# Patient Record
Sex: FEMALE | Race: WHITE
[De-identification: names, ages, dates, MRNs, and addresses within clinical notes are randomized per-mention and may not be internally consistent; named-entity substitution may affect disease eponyms.]

---

## 2018-10-03 ENCOUNTER — HOSPITAL ENCOUNTER (OUTPATIENT)
Dept: HOSPITAL 8 - LDOP | Age: 23
Discharge: HOME | End: 2018-10-03
Attending: OBSTETRICS & GYNECOLOGY
Payer: MEDICAID

## 2018-10-03 VITALS — HEIGHT: 64 IN | BODY MASS INDEX: 20.7 KG/M2 | WEIGHT: 121.25 LBS

## 2018-10-03 DIAGNOSIS — Z3A.37: ICD-10-CM

## 2018-10-03 DIAGNOSIS — O26.893: Primary | ICD-10-CM

## 2018-10-03 DIAGNOSIS — R10.9: ICD-10-CM

## 2018-10-03 PROCEDURE — 99211 OFF/OP EST MAY X REQ PHY/QHP: CPT

## 2018-10-03 PROCEDURE — G0463 HOSPITAL OUTPT CLINIC VISIT: HCPCS

## 2018-10-03 PROCEDURE — 59025 FETAL NON-STRESS TEST: CPT

## 2018-10-13 ENCOUNTER — HOSPITAL ENCOUNTER (INPATIENT)
Dept: HOSPITAL 8 - LDOP | Age: 23
LOS: 2 days | Discharge: HOME | End: 2018-10-15
Attending: OBSTETRICS & GYNECOLOGY | Admitting: OBSTETRICS & GYNECOLOGY
Payer: MEDICAID

## 2018-10-13 VITALS — WEIGHT: 119.05 LBS | BODY MASS INDEX: 20.32 KG/M2 | HEIGHT: 64 IN

## 2018-10-13 VITALS — SYSTOLIC BLOOD PRESSURE: 116 MMHG | DIASTOLIC BLOOD PRESSURE: 86 MMHG

## 2018-10-13 VITALS — DIASTOLIC BLOOD PRESSURE: 82 MMHG | SYSTOLIC BLOOD PRESSURE: 121 MMHG

## 2018-10-13 DIAGNOSIS — Z3A.38: ICD-10-CM

## 2018-10-13 LAB
BASOPHILS # BLD AUTO: 0.04 X10^3/UL (ref 0–0.1)
BASOPHILS NFR BLD AUTO: 0 % (ref 0–1)
EOSINOPHIL # BLD AUTO: 0.1 X10^3/UL (ref 0–0.4)
EOSINOPHIL NFR BLD AUTO: 1 % (ref 1–7)
ERYTHROCYTE [DISTWIDTH] IN BLOOD BY AUTOMATED COUNT: 14.1 % (ref 9.6–15.2)
LYMPHOCYTES # BLD AUTO: 1.85 X10^3/UL (ref 1–3.4)
LYMPHOCYTES NFR BLD AUTO: 13 % (ref 22–44)
MCH RBC QN AUTO: 30 PG (ref 27–34.8)
MCHC RBC AUTO-ENTMCNC: 32.9 G/DL (ref 32.4–35.8)
MCV RBC AUTO: 91.2 FL (ref 80–100)
MD: (no result)
MONOCYTES # BLD AUTO: 0.82 X10^3/UL (ref 0.2–0.8)
MONOCYTES NFR BLD AUTO: 6 % (ref 2–9)
NEUTROPHILS # BLD AUTO: 11.01 X10^3/UL (ref 1.8–6.8)
NEUTROPHILS NFR BLD AUTO: 80 % (ref 42–75)
PLATELET # BLD AUTO: 279 X10^3/UL (ref 130–400)
PMV BLD AUTO: 10.1 FL (ref 7.4–10.4)
RBC # BLD AUTO: 3.79 X10^6/UL (ref 3.82–5.3)

## 2018-10-13 PROCEDURE — 36415 COLL VENOUS BLD VENIPUNCTURE: CPT

## 2018-10-13 PROCEDURE — 85025 COMPLETE CBC W/AUTO DIFF WBC: CPT

## 2018-10-13 PROCEDURE — 86900 BLOOD TYPING SEROLOGIC ABO: CPT

## 2018-10-13 PROCEDURE — 00HU33Z INSERTION OF INFUSION DEVICE INTO SPINAL CANAL, PERCUTANEOUS APPROACH: ICD-10-PCS | Performed by: OBSTETRICS & GYNECOLOGY

## 2018-10-13 PROCEDURE — 10907ZC DRAINAGE OF AMNIOTIC FLUID, THERAPEUTIC FROM PRODUCTS OF CONCEPTION, VIA NATURAL OR ARTIFICIAL OPENING: ICD-10-PCS | Performed by: OBSTETRICS & GYNECOLOGY

## 2018-10-13 PROCEDURE — 90656 IIV3 VACC NO PRSV 0.5 ML IM: CPT

## 2018-10-13 PROCEDURE — 3E0R3BZ INTRODUCTION OF ANESTHETIC AGENT INTO SPINAL CANAL, PERCUTANEOUS APPROACH: ICD-10-PCS | Performed by: OBSTETRICS & GYNECOLOGY

## 2018-10-13 PROCEDURE — 86850 RBC ANTIBODY SCREEN: CPT

## 2018-10-13 RX ADMIN — Medication SCH MLS/HR: at 21:26

## 2018-10-13 RX ADMIN — SODIUM CHLORIDE, SODIUM LACTATE, POTASSIUM CHLORIDE, AND CALCIUM CHLORIDE SCH MLS/HR: .6; .31; .03; .02 INJECTION, SOLUTION INTRAVENOUS at 21:26

## 2018-10-14 VITALS — DIASTOLIC BLOOD PRESSURE: 81 MMHG | SYSTOLIC BLOOD PRESSURE: 120 MMHG

## 2018-10-14 VITALS — DIASTOLIC BLOOD PRESSURE: 74 MMHG | SYSTOLIC BLOOD PRESSURE: 113 MMHG

## 2018-10-14 VITALS — DIASTOLIC BLOOD PRESSURE: 70 MMHG | SYSTOLIC BLOOD PRESSURE: 112 MMHG

## 2018-10-14 VITALS — SYSTOLIC BLOOD PRESSURE: 115 MMHG | DIASTOLIC BLOOD PRESSURE: 79 MMHG

## 2018-10-14 LAB
BASOPHILS # BLD AUTO: 0.01 X10^3/UL (ref 0–0.1)
BASOPHILS NFR BLD AUTO: 0 % (ref 0–1)
EOSINOPHIL # BLD AUTO: 0.03 X10^3/UL (ref 0–0.4)
EOSINOPHIL NFR BLD AUTO: 0 % (ref 1–7)
ERYTHROCYTE [DISTWIDTH] IN BLOOD BY AUTOMATED COUNT: 14 % (ref 9.6–15.2)
LYMPHOCYTES # BLD AUTO: 1.7 X10^3/UL (ref 1–3.4)
LYMPHOCYTES NFR BLD AUTO: 9 % (ref 22–44)
MCH RBC QN AUTO: 30.3 PG (ref 27–34.8)
MCHC RBC AUTO-ENTMCNC: 33.4 G/DL (ref 32.4–35.8)
MCV RBC AUTO: 90.8 FL (ref 80–100)
MD: (no result)
MONOCYTES # BLD AUTO: 0.71 X10^3/UL (ref 0.2–0.8)
MONOCYTES NFR BLD AUTO: 4 % (ref 2–9)
NEUTROPHILS # BLD AUTO: 16.01 X10^3/UL (ref 1.8–6.8)
NEUTROPHILS NFR BLD AUTO: 87 % (ref 42–75)
PLATELET # BLD AUTO: 278 X10^3/UL (ref 130–400)
PMV BLD AUTO: 10 FL (ref 7.4–10.4)
RBC # BLD AUTO: 3.77 X10^6/UL (ref 3.82–5.3)

## 2018-10-14 RX ADMIN — SODIUM CHLORIDE, SODIUM LACTATE, POTASSIUM CHLORIDE, AND CALCIUM CHLORIDE SCH MLS/HR: .6; .31; .03; .02 INJECTION, SOLUTION INTRAVENOUS at 10:22

## 2018-10-14 RX ADMIN — DOCUSATE SODIUM PRN MG: 100 CAPSULE, LIQUID FILLED ORAL at 00:55

## 2018-10-14 RX ADMIN — SODIUM CHLORIDE, SODIUM LACTATE, POTASSIUM CHLORIDE, AND CALCIUM CHLORIDE SCH MLS/HR: .6; .31; .03; .02 INJECTION, SOLUTION INTRAVENOUS at 02:22

## 2018-10-14 RX ADMIN — IBUPROFEN PRN MG: 800 TABLET ORAL at 08:57

## 2018-10-14 RX ADMIN — PRENATAL VIT W/ FE FUMARATE-FA TAB 27-0.8 MG SCH EACH: 27-0.8 TAB at 08:56

## 2018-10-14 RX ADMIN — Medication SCH MLS/HR: at 17:10

## 2018-10-14 RX ADMIN — IBUPROFEN PRN MG: 800 TABLET ORAL at 17:13

## 2018-10-14 RX ADMIN — DOCUSATE SODIUM PRN MG: 100 CAPSULE, LIQUID FILLED ORAL at 08:57

## 2018-10-14 RX ADMIN — SODIUM CHLORIDE, SODIUM LACTATE, POTASSIUM CHLORIDE, AND CALCIUM CHLORIDE SCH MLS/HR: .6; .31; .03; .02 INJECTION, SOLUTION INTRAVENOUS at 18:22

## 2018-10-14 RX ADMIN — IBUPROFEN PRN MG: 800 TABLET ORAL at 00:55

## 2018-10-14 RX ADMIN — OXYCODONE HYDROCHLORIDE PRN MG: 5 TABLET ORAL at 19:48

## 2018-10-14 RX ADMIN — Medication SCH MLS/HR: at 07:10

## 2018-10-15 VITALS — SYSTOLIC BLOOD PRESSURE: 123 MMHG | DIASTOLIC BLOOD PRESSURE: 81 MMHG

## 2018-10-15 RX ADMIN — Medication SCH MLS/HR: at 03:10

## 2018-10-15 RX ADMIN — PRENATAL VIT W/ FE FUMARATE-FA TAB 27-0.8 MG SCH EACH: 27-0.8 TAB at 08:34

## 2018-10-15 RX ADMIN — SODIUM CHLORIDE, SODIUM LACTATE, POTASSIUM CHLORIDE, AND CALCIUM CHLORIDE SCH MLS/HR: .6; .31; .03; .02 INJECTION, SOLUTION INTRAVENOUS at 02:22

## 2018-10-15 RX ADMIN — DOCUSATE SODIUM PRN MG: 100 CAPSULE, LIQUID FILLED ORAL at 08:34

## 2018-10-15 RX ADMIN — IBUPROFEN PRN MG: 800 TABLET ORAL at 01:29

## 2018-10-15 RX ADMIN — IBUPROFEN PRN MG: 800 TABLET ORAL at 10:21

## 2018-10-15 RX ADMIN — OXYCODONE HYDROCHLORIDE PRN MG: 5 TABLET ORAL at 01:29

## 2018-10-17 ENCOUNTER — HOSPITAL ENCOUNTER (EMERGENCY)
Dept: HOSPITAL 8 - ED | Age: 23
Discharge: HOME | End: 2018-10-17
Payer: MEDICAID

## 2018-10-17 VITALS — DIASTOLIC BLOOD PRESSURE: 88 MMHG | SYSTOLIC BLOOD PRESSURE: 133 MMHG

## 2018-10-17 VITALS — BODY MASS INDEX: 18.52 KG/M2 | HEIGHT: 64 IN | WEIGHT: 108.47 LBS

## 2018-10-17 DIAGNOSIS — N61.0: Primary | ICD-10-CM

## 2018-10-17 PROCEDURE — 99283 EMERGENCY DEPT VISIT LOW MDM: CPT

## 2019-02-11 ENCOUNTER — HOSPITAL ENCOUNTER (EMERGENCY)
Dept: HOSPITAL 8 - ED | Age: 24
Discharge: HOME | End: 2019-02-11
Payer: MEDICAID

## 2019-02-11 VITALS — BODY MASS INDEX: 17.3 KG/M2 | HEIGHT: 63 IN | WEIGHT: 97.66 LBS

## 2019-02-11 VITALS — SYSTOLIC BLOOD PRESSURE: 106 MMHG | DIASTOLIC BLOOD PRESSURE: 65 MMHG

## 2019-02-11 DIAGNOSIS — O21.9: Primary | ICD-10-CM

## 2019-02-11 DIAGNOSIS — Z3A.01: ICD-10-CM

## 2019-02-11 DIAGNOSIS — J10.1: ICD-10-CM

## 2019-02-11 LAB
ALBUMIN SERPL-MCNC: 4.2 G/DL (ref 3.4–5)
ANION GAP SERPL CALC-SCNC: 6 MMOL/L (ref 5–15)
BASOPHILS # BLD AUTO: 0.01 X10^3/UL (ref 0–0.1)
BASOPHILS NFR BLD AUTO: 0 % (ref 0–1)
CALCIUM SERPL-MCNC: 9 MG/DL (ref 8.5–10.1)
CHLORIDE SERPL-SCNC: 107 MMOL/L (ref 98–107)
CREAT SERPL-MCNC: 0.86 MG/DL (ref 0.55–1.02)
CULTURE INDICATED?: YES
EOSINOPHIL # BLD AUTO: 0.01 X10^3/UL (ref 0–0.4)
EOSINOPHIL NFR BLD AUTO: 0 % (ref 1–7)
ERYTHROCYTE [DISTWIDTH] IN BLOOD BY AUTOMATED COUNT: 14.3 % (ref 9.6–15.2)
FLUAV AG SPEC QL IA: POSITIVE
LYMPHOCYTES # BLD AUTO: 0.57 X10^3/UL (ref 1–3.4)
LYMPHOCYTES NFR BLD AUTO: 8 % (ref 22–44)
MCH RBC QN AUTO: 29.3 PG (ref 27–34.8)
MCHC RBC AUTO-ENTMCNC: 33.2 G/DL (ref 32.4–35.8)
MCV RBC AUTO: 88.3 FL (ref 80–100)
MD: NO
MICROSCOPIC: (no result)
MONOCYTES # BLD AUTO: 0.75 X10^3/UL (ref 0.2–0.8)
MONOCYTES NFR BLD AUTO: 11 % (ref 2–9)
NEUTROPHILS # BLD AUTO: 5.58 X10^3/UL (ref 1.8–6.8)
NEUTROPHILS NFR BLD AUTO: 81 % (ref 42–75)
PLATELET # BLD AUTO: 348 X10^3/UL (ref 130–400)
PMV BLD AUTO: 8.3 FL (ref 7.4–10.4)
RBC # BLD AUTO: 4.78 X10^6/UL (ref 3.82–5.3)

## 2019-02-11 PROCEDURE — 76801 OB US < 14 WKS SINGLE FETUS: CPT

## 2019-02-11 PROCEDURE — 80048 BASIC METABOLIC PNL TOTAL CA: CPT

## 2019-02-11 PROCEDURE — 84702 CHORIONIC GONADOTROPIN TEST: CPT

## 2019-02-11 PROCEDURE — 81001 URINALYSIS AUTO W/SCOPE: CPT

## 2019-02-11 PROCEDURE — 82040 ASSAY OF SERUM ALBUMIN: CPT

## 2019-02-11 PROCEDURE — 87400 INFLUENZA A/B EACH AG IA: CPT

## 2019-02-11 PROCEDURE — 87086 URINE CULTURE/COLONY COUNT: CPT

## 2019-02-11 PROCEDURE — 99284 EMERGENCY DEPT VISIT MOD MDM: CPT

## 2019-02-11 PROCEDURE — 36415 COLL VENOUS BLD VENIPUNCTURE: CPT

## 2019-02-11 PROCEDURE — 84703 CHORIONIC GONADOTROPIN ASSAY: CPT

## 2019-02-11 PROCEDURE — 85025 COMPLETE CBC W/AUTO DIFF WBC: CPT

## 2019-02-11 NOTE — NUR
PT REPORTS IMPROVEMENT IN NAUSEA WITH MEDICATIONS. MEDICATED PER EMAR FOR PAIN. 
PT AND SO UPDATED TO POC (RESULTS/RECHECK) AND DEMONSTRATES UNDERSTANDING.

## 2019-02-11 NOTE — NUR
FIRST CONTACT WITH PT. PT LAYING IN CALI, NAD NOTED; PWD. PT REPORTS N/V/FLU 
LIKE SYMPTOMS X SEVERAL DAYS. "MY KIDS WERE JUST DIAGNOSED WITH THE FLU, I 
THINK I GOT IT FROM THEM". +GENERALIZED ABD PAIN/DYSURIA. 

LMP: "SOME TIME IN DECEMBER". . 



PT DENIES BLOOD IN EMESIS OR STOOL/VAGINAL DC OR BLEEDING. 

DISCUSSED POSITIVE HCG WITH PROVIDER, US ORDERED. PT UPDATED TO POC.

## 2020-04-08 ENCOUNTER — HOSPITAL ENCOUNTER (OUTPATIENT)
Facility: MEDICAL CENTER | Age: 25
End: 2020-04-08
Attending: NURSE PRACTITIONER
Payer: MEDICAID

## 2020-04-08 ENCOUNTER — OFFICE VISIT (OUTPATIENT)
Dept: URGENT CARE | Facility: CLINIC | Age: 25
End: 2020-04-08
Payer: MEDICAID

## 2020-04-08 VITALS
BODY MASS INDEX: 16.73 KG/M2 | TEMPERATURE: 101.4 F | RESPIRATION RATE: 12 BRPM | SYSTOLIC BLOOD PRESSURE: 104 MMHG | HEIGHT: 64 IN | HEART RATE: 94 BPM | OXYGEN SATURATION: 99 % | WEIGHT: 98 LBS | DIASTOLIC BLOOD PRESSURE: 64 MMHG

## 2020-04-08 DIAGNOSIS — R30.0 DYSURIA: ICD-10-CM

## 2020-04-08 DIAGNOSIS — N30.01 ACUTE CYSTITIS WITH HEMATURIA: ICD-10-CM

## 2020-04-08 LAB
APPEARANCE UR: ABNORMAL
BILIRUB UR STRIP-MCNC: ABNORMAL MG/DL
COLOR UR AUTO: ABNORMAL
FORWARD REASON: SPWHY: NORMAL
FORWARDED TO LAB: SPWHR: NORMAL
GLUCOSE UR STRIP.AUTO-MCNC: ABNORMAL MG/DL
INT CON NEG: NORMAL
INT CON POS: NORMAL
KETONES UR STRIP.AUTO-MCNC: 15 MG/DL
LEUKOCYTE ESTERASE UR QL STRIP.AUTO: ABNORMAL
NITRITE UR QL STRIP.AUTO: POSITIVE
PH UR STRIP.AUTO: 8.5 [PH] (ref 5–8)
POC URINE PREGNANCY TEST: NEGATIVE
PROT UR QL STRIP: >=300 MG/DL
RBC UR QL AUTO: ABNORMAL
SP GR UR STRIP.AUTO: 1.02
SPECIMEN SENT (2ND): SPWT2: NORMAL
SPECIMEN SENT (3RD): SPWT3: NORMAL
SPECIMEN SENT (4TH): SPWT4: NORMAL
SPECIMEN SENT: SPWT1: NORMAL
UROBILINOGEN UR STRIP-MCNC: 1 MG/DL

## 2020-04-08 PROCEDURE — 81002 URINALYSIS NONAUTO W/O SCOPE: CPT | Performed by: NURSE PRACTITIONER

## 2020-04-08 PROCEDURE — 99204 OFFICE O/P NEW MOD 45 MIN: CPT | Performed by: NURSE PRACTITIONER

## 2020-04-08 PROCEDURE — 81025 URINE PREGNANCY TEST: CPT | Performed by: NURSE PRACTITIONER

## 2020-04-08 RX ORDER — CIPROFLOXACIN 500 MG/1
500 TABLET, FILM COATED ORAL EVERY 12 HOURS
Qty: 14 TAB | Refills: 0 | Status: SHIPPED | OUTPATIENT
Start: 2020-04-08 | End: 2020-04-15

## 2020-04-08 ASSESSMENT — ENCOUNTER SYMPTOMS
CHILLS: 0
BACK PAIN: 0
DIARRHEA: 0
HEADACHES: 0
MYALGIAS: 0
FLANK PAIN: 1
DIZZINESS: 0
ABDOMINAL PAIN: 0
CONSTIPATION: 0
WEAKNESS: 0
VOMITING: 0
NAUSEA: 0
FEVER: 0

## 2020-04-08 NOTE — PROGRESS NOTES
"Subjective:      Samantha Fierro is a 24 y.o. female who presents with Abdominal Pain (x3 days); Dysuria (x3 days); and Flank Pain (right)            HPI  Dysuria. Blood in urine, possible start of menstrual cycle. Denies fever at home. Denies n/vor low pelvic pain. Right side flank pain. Prone to UTIs. Last UTI was last month. AZO usually helps with UTIs but state this time has not worked. No birth control, not currently sexually active. Has zofran at home.    PMH:  has no past medical history on file.  MEDS: No current outpatient medications on file.  ALLERGIES: No Known Allergies  SURGHX: History reviewed. No pertinent surgical history.  SOCHX:  reports that she has never smoked. She has never used smokeless tobacco. She reports that she does not drink alcohol or use drugs.  FH: Family history was reviewed, no pertinent findings to report    Review of Systems   Constitutional: Negative for chills, fever and malaise/fatigue.   Gastrointestinal: Negative for abdominal pain, constipation, diarrhea, nausea and vomiting.   Genitourinary: Positive for dysuria, flank pain, frequency, hematuria and urgency.   Musculoskeletal: Negative for back pain and myalgias.   Skin: Negative for itching and rash.   Neurological: Negative for dizziness, weakness and headaches.   All other systems reviewed and are negative.         Objective:     /64   Pulse 94   Temp (!) 38.6 °C (101.4 °F) (Temporal)   Resp 12   Ht 1.626 m (5' 4\")   Wt 44.5 kg (98 lb)   LMP 03/31/2020 (Exact Date)   SpO2 99%   Breastfeeding No   BMI 16.82 kg/m²      Physical Exam  Vitals signs reviewed.   Constitutional:       General: She is awake. She is not in acute distress.     Appearance: Normal appearance. She is well-developed. She is not ill-appearing, toxic-appearing or diaphoretic.   HENT:      Head: Normocephalic.   Eyes:      Conjunctiva/sclera: Conjunctivae normal.      Pupils: Pupils are equal, round, and reactive to light. "   Neck:      Musculoskeletal: Normal range of motion and neck supple.   Cardiovascular:      Rate and Rhythm: Normal rate.   Pulmonary:      Effort: Pulmonary effort is normal.   Abdominal:      General: Bowel sounds are normal. There is no distension.      Palpations: Abdomen is soft.      Tenderness: There is no abdominal tenderness. There is right CVA tenderness. There is no left CVA tenderness, guarding or rebound.   Musculoskeletal: Normal range of motion.   Skin:     General: Skin is warm and dry.   Neurological:      Mental Status: She is alert and oriented to person, place, and time.   Psychiatric:         Behavior: Behavior is cooperative.                 Assessment/Plan:       1. Acute cystitis with hematuria    - ciprofloxacin (CIPRO) 500 MG Tab; Take 1 Tab by mouth every 12 hours for 7 days.  Dispense: 14 Tab; Refill: 0  - Urine Culture; Future  - cefTRIAXone (ROCEPHIN) 1 g, lidocaine (XYLOCAINE) 1 % 3.6 mL for IM use    2. Dysuria    - POCT Urinalysis  - POCT PREGNANCY: NEG    Increase water intake  Urinate more frequently and empty bladder completely  Practice good toileting hygiene after bowel movements and sexual intercourse, refrain from sexual intercourse until infection cleared  Monitor for back/flank pain, difficulty urinating, blood in urine, fever, n/v in next 12 hrs- need re-evaluation    Call number [phone] if MyChart not available, will sign up today for this

## 2020-04-09 ENCOUNTER — TELEPHONE (OUTPATIENT)
Dept: URGENT CARE | Facility: PHYSICIAN GROUP | Age: 25
End: 2020-04-09

## 2020-04-09 NOTE — TELEPHONE ENCOUNTER
Called and spoke to patient regarding urine culture to be sent to [facility] lab. Patient to be notified when this comes back. Patient states doing better.

## 2020-04-10 ENCOUNTER — TELEPHONE (OUTPATIENT)
Dept: URGENT CARE | Facility: PHYSICIAN GROUP | Age: 25
End: 2020-04-10

## 2020-04-10 NOTE — TELEPHONE ENCOUNTER
"[facility] called by JEAN Ma, in [place], regarding urine culture status. Francesca states urine cullture \"still pending\". #[phone], option 2, acct#[id number].  "

## 2020-04-13 ENCOUNTER — TELEPHONE (OUTPATIENT)
Dept: URGENT CARE | Facility: PHYSICIAN GROUP | Age: 25
End: 2020-04-13

## 2020-04-13 NOTE — TELEPHONE ENCOUNTER
"Called patient twice today, on given phone number at time of exam, [phone], no answer, no message machine available to leave message. Urine culture result was received when I called [facility] today. Had [facility] fax to [facility] at [phone]. Spoke to JEAN Suazo at [facility], today, regarding [facility] to fax over result and scan into chart. Lakeisha agreed to this. Urine culture result states \">100.,00 E.coli\" per [facility] representative, Rachel, with [facility].     Patient on appropriate antibiotics, please finish completely.  "

## 2020-08-24 ENCOUNTER — HOSPITAL ENCOUNTER (EMERGENCY)
Dept: HOSPITAL 8 - ED | Age: 25
Discharge: HOME | End: 2020-08-24
Payer: MEDICAID

## 2020-08-24 VITALS — WEIGHT: 97 LBS | BODY MASS INDEX: 16.56 KG/M2 | HEIGHT: 64 IN

## 2020-08-24 VITALS — DIASTOLIC BLOOD PRESSURE: 74 MMHG | SYSTOLIC BLOOD PRESSURE: 118 MMHG

## 2020-08-24 DIAGNOSIS — L01.01: Primary | ICD-10-CM

## 2020-08-24 PROCEDURE — 99283 EMERGENCY DEPT VISIT LOW MDM: CPT

## 2020-08-24 PROCEDURE — 99281 EMR DPT VST MAYX REQ PHY/QHP: CPT

## 2022-05-18 SDOH — ECONOMIC STABILITY: FOOD INSECURITY: WITHIN THE PAST 12 MONTHS, THE FOOD YOU BOUGHT JUST DIDN'T LAST AND YOU DIDN'T HAVE MONEY TO GET MORE.: NEVER TRUE

## 2022-05-18 SDOH — ECONOMIC STABILITY: HOUSING INSECURITY
IN THE LAST 12 MONTHS, WAS THERE A TIME WHEN YOU DID NOT HAVE A STEADY PLACE TO SLEEP OR SLEPT IN A SHELTER (INCLUDING NOW)?: NO

## 2022-05-18 SDOH — ECONOMIC STABILITY: HOUSING INSECURITY: IN THE LAST 12 MONTHS, HOW MANY PLACES HAVE YOU LIVED?: 2

## 2022-05-18 SDOH — ECONOMIC STABILITY: FOOD INSECURITY: WITHIN THE PAST 12 MONTHS, YOU WORRIED THAT YOUR FOOD WOULD RUN OUT BEFORE YOU GOT MONEY TO BUY MORE.: NEVER TRUE

## 2022-05-18 SDOH — HEALTH STABILITY: PHYSICAL HEALTH: ON AVERAGE, HOW MANY DAYS PER WEEK DO YOU ENGAGE IN MODERATE TO STRENUOUS EXERCISE (LIKE A BRISK WALK)?: 7 DAYS

## 2022-05-18 SDOH — HEALTH STABILITY: PHYSICAL HEALTH: ON AVERAGE, HOW MANY MINUTES DO YOU ENGAGE IN EXERCISE AT THIS LEVEL?: 30 MIN

## 2022-05-18 SDOH — ECONOMIC STABILITY: INCOME INSECURITY: HOW HARD IS IT FOR YOU TO PAY FOR THE VERY BASICS LIKE FOOD, HOUSING, MEDICAL CARE, AND HEATING?: NOT VERY HARD

## 2022-05-18 SDOH — ECONOMIC STABILITY: TRANSPORTATION INSECURITY
IN THE PAST 12 MONTHS, HAS THE LACK OF TRANSPORTATION KEPT YOU FROM MEDICAL APPOINTMENTS OR FROM GETTING MEDICATIONS?: YES

## 2022-05-18 SDOH — ECONOMIC STABILITY: TRANSPORTATION INSECURITY
IN THE PAST 12 MONTHS, HAS LACK OF RELIABLE TRANSPORTATION KEPT YOU FROM MEDICAL APPOINTMENTS, MEETINGS, WORK OR FROM GETTING THINGS NEEDED FOR DAILY LIVING?: YES

## 2022-05-18 SDOH — ECONOMIC STABILITY: INCOME INSECURITY: IN THE LAST 12 MONTHS, WAS THERE A TIME WHEN YOU WERE NOT ABLE TO PAY THE MORTGAGE OR RENT ON TIME?: NO

## 2022-05-18 SDOH — HEALTH STABILITY: MENTAL HEALTH
STRESS IS WHEN SOMEONE FEELS TENSE, NERVOUS, ANXIOUS, OR CAN'T SLEEP AT NIGHT BECAUSE THEIR MIND IS TROUBLED. HOW STRESSED ARE YOU?: ONLY A LITTLE

## 2022-05-18 SDOH — ECONOMIC STABILITY: TRANSPORTATION INSECURITY
IN THE PAST 12 MONTHS, HAS LACK OF TRANSPORTATION KEPT YOU FROM MEETINGS, WORK, OR FROM GETTING THINGS NEEDED FOR DAILY LIVING?: YES

## 2022-05-18 ASSESSMENT — SOCIAL DETERMINANTS OF HEALTH (SDOH)
HOW OFTEN DO YOU ATTEND CHURCH OR RELIGIOUS SERVICES?: NEVER
HOW OFTEN DO YOU ATTENT MEETINGS OF THE CLUB OR ORGANIZATION YOU BELONG TO?: PATIENT DECLINED
HOW OFTEN DO YOU HAVE A DRINK CONTAINING ALCOHOL: NEVER
HOW OFTEN DO YOU GET TOGETHER WITH FRIENDS OR RELATIVES?: ONCE A WEEK
HOW OFTEN DO YOU ATTEND CHURCH OR RELIGIOUS SERVICES?: NEVER
WITHIN THE PAST 12 MONTHS, YOU WORRIED THAT YOUR FOOD WOULD RUN OUT BEFORE YOU GOT THE MONEY TO BUY MORE: NEVER TRUE
ARE YOU MARRIED, WIDOWED, DIVORCED, SEPARATED, NEVER MARRIED, OR LIVING WITH A PARTNER?: NEVER MARRIED
IN A TYPICAL WEEK, HOW MANY TIMES DO YOU TALK ON THE PHONE WITH FAMILY, FRIENDS, OR NEIGHBORS?: NEVER
DO YOU BELONG TO ANY CLUBS OR ORGANIZATIONS SUCH AS CHURCH GROUPS UNIONS, FRATERNAL OR ATHLETIC GROUPS, OR SCHOOL GROUPS?: NO
HOW OFTEN DO YOU HAVE SIX OR MORE DRINKS ON ONE OCCASION: MONTHLY
HOW OFTEN DO YOU ATTENT MEETINGS OF THE CLUB OR ORGANIZATION YOU BELONG TO?: PATIENT DECLINED
ARE YOU MARRIED, WIDOWED, DIVORCED, SEPARATED, NEVER MARRIED, OR LIVING WITH A PARTNER?: NEVER MARRIED
DO YOU BELONG TO ANY CLUBS OR ORGANIZATIONS SUCH AS CHURCH GROUPS UNIONS, FRATERNAL OR ATHLETIC GROUPS, OR SCHOOL GROUPS?: NO
HOW OFTEN DO YOU GET TOGETHER WITH FRIENDS OR RELATIVES?: ONCE A WEEK
HOW MANY DRINKS CONTAINING ALCOHOL DO YOU HAVE ON A TYPICAL DAY WHEN YOU ARE DRINKING: PATIENT DOES NOT DRINK
HOW HARD IS IT FOR YOU TO PAY FOR THE VERY BASICS LIKE FOOD, HOUSING, MEDICAL CARE, AND HEATING?: NOT VERY HARD
IN A TYPICAL WEEK, HOW MANY TIMES DO YOU TALK ON THE PHONE WITH FAMILY, FRIENDS, OR NEIGHBORS?: NEVER

## 2022-05-18 ASSESSMENT — LIFESTYLE VARIABLES
HOW MANY STANDARD DRINKS CONTAINING ALCOHOL DO YOU HAVE ON A TYPICAL DAY: PATIENT DOES NOT DRINK
HOW OFTEN DO YOU HAVE SIX OR MORE DRINKS ON ONE OCCASION: MONTHLY
HOW OFTEN DO YOU HAVE A DRINK CONTAINING ALCOHOL: NEVER
AUDIT-C TOTAL SCORE: 2
SKIP TO QUESTIONS 9-10: 0

## 2022-05-19 ENCOUNTER — OFFICE VISIT (OUTPATIENT)
Dept: MEDICAL GROUP | Facility: CLINIC | Age: 27
End: 2022-05-19
Payer: COMMERCIAL

## 2022-05-19 VITALS
SYSTOLIC BLOOD PRESSURE: 102 MMHG | OXYGEN SATURATION: 96 % | WEIGHT: 122 LBS | RESPIRATION RATE: 16 BRPM | BODY MASS INDEX: 20.83 KG/M2 | HEART RATE: 104 BPM | DIASTOLIC BLOOD PRESSURE: 64 MMHG | HEIGHT: 64 IN

## 2022-05-19 DIAGNOSIS — M67.431 GANGLION CYST OF DORSUM OF RIGHT WRIST: ICD-10-CM

## 2022-05-19 DIAGNOSIS — H60.391 SKIN OF RIGHT EARLOBE WITH INFECTION: Primary | ICD-10-CM

## 2022-05-19 PROCEDURE — 99203 OFFICE O/P NEW LOW 30 MIN: CPT | Mod: GE | Performed by: STUDENT IN AN ORGANIZED HEALTH CARE EDUCATION/TRAINING PROGRAM

## 2022-05-19 RX ORDER — CEPHALEXIN 500 MG/1
500 CAPSULE ORAL 4 TIMES DAILY
Qty: 28 CAPSULE | Refills: 0 | Status: SHIPPED | OUTPATIENT
Start: 2022-05-19 | End: 2022-05-26

## 2022-05-19 ASSESSMENT — PATIENT HEALTH QUESTIONNAIRE - PHQ9: CLINICAL INTERPRETATION OF PHQ2 SCORE: 0

## 2022-05-19 NOTE — ASSESSMENT & PLAN NOTE
Plan to treat simple, soft tissue infection with Keflex.  Strict return precautions given in case of worsening symptoms.  Patient declined referral to ENT but will monitor and follow-up as needed if considering surgical evaluation.

## 2022-05-19 NOTE — ASSESSMENT & PLAN NOTE
Patient likely has a right ganglion cyst.  Patient offered watchful waiting with NSAID treatment, aspiration, or surgical evaluation.  She elects for orthopedic surgery referral for definitive treatment.  Follow-up as needed.

## 2022-05-19 NOTE — PROGRESS NOTES
Subjective:   CC:   Chief Complaint   Patient presents with   • New Patient     Ganglion cyst on wrist, infection on upper ear       HPI: Samantha is a 26 y.o. female who presents today for the following problems:    Problem   Ganglion Cyst of Dorsum of Right Wrist    Right wrist ganglion cyst on the dorsum of the hand which has been increasing in size over the past several months.  Painful with pushing a stroller and carrying her infant.  No history of trauma.  No overlying skin complaints.  She does have some radiation of the mild-moderate pain up into the forearm but no other associated symptoms.  Not taking any medications for the pain.     Skin of Right Earlobe With Infection    Piercing site infection recurrently over the past year with purulent drainage occasionally she had 1 course of antibiotics which worked well about 3 months ago and erythema but currently only with mild erythema.  Denies fevers.  Endorses a lymph node postauricularly but no other associated symptoms.         Current Outpatient Medications   Medication Sig Dispense Refill   • cephALEXin (KEFLEX) 500 MG Cap Take 1 Capsule by mouth 4 times a day for 7 days. 28 Capsule 0     No current facility-administered medications for this visit.       Social History     Socioeconomic History   • Marital status: Single     Spouse name: Not on file   • Number of children: Not on file   • Years of education: Not on file   • Highest education level: 11th grade   Occupational History   • Not on file   Tobacco Use   • Smoking status: Never Smoker   • Smokeless tobacco: Never Used   Vaping Use   • Vaping Use: Never used   Substance and Sexual Activity   • Alcohol use: No   • Drug use: No   • Sexual activity: Yes     Partners: Male     Birth control/protection: Injection   Other Topics Concern   • Not on file   Social History Narrative   • Not on file     Social Determinants of Health     Financial Resource Strain: Low Risk    • Difficulty of Paying Living  "Expenses: Not very hard   Food Insecurity: No Food Insecurity   • Worried About Running Out of Food in the Last Year: Never true   • Ran Out of Food in the Last Year: Never true   Transportation Needs: Unmet Transportation Needs   • Lack of Transportation (Medical): Yes   • Lack of Transportation (Non-Medical): Yes   Physical Activity: Sufficiently Active   • Days of Exercise per Week: 7 days   • Minutes of Exercise per Session: 30 min   Stress: No Stress Concern Present   • Feeling of Stress : Only a little   Social Connections: Socially Isolated   • Frequency of Communication with Friends and Family: Never   • Frequency of Social Gatherings with Friends and Family: Once a week   • Attends [employer]ism Services: Never   • Active Member of Clubs or Organizations: No   • Attends Club or Organization Meetings: Patient refused   • Marital Status: Never    Intimate Partner Violence: Not on file   Housing Stability: Low Risk    • Unable to Pay for Housing in the Last Year: No   • Number of Places Lived in the Last Year: 2   • Unstable Housing in the Last Year: No       Objective:     Vitals:    05/19/22 0854   BP: 102/64   BP Location: Left arm   Patient Position: Sitting   BP Cuff Size: Adult   Pulse: (!) 104   Resp: 16   SpO2: 96%   Weight: 55.3 kg (122 lb)   Height: 1.626 m (5' 4\")     Body mass index is 20.94 kg/m².     Physical Exam:   VS: reviewed  GEN: appearance nl, NAD  SKIN: no rashes or lesions, anicteric   HEAD: AT/NC  EYES: conjunctiva clear, pupils equal  EARS: hearing  normal, external ear on the right with mild erythema about the piercing site and a clean, dry, and healing lesion on the auricle at the site of previous drainage but with no current discharge.  Site of infection is not tender to palpation with no fluctuation or mass in the area of concern.  NOSE: no discharge, external nose wnl  RESP: normal respiratory movements  EXT: no clubbing, cyanosis or edema  MSK: normal gait   NEURO: moving all " four extremities w/o focal deficit; alert, awake, cooperative, answers questions appropriately  PSYCH: mood and affect appropriate; not depressed     RIGHT WRIST:  - Inspection: No erythema or edema appreciated.  Approximately 1 x 1 cm, well circumscribed mass on the dorsum of the wrist with no overlying skin changes.  - Palpation: No TTP along palmar aspect of wrist. No TTP in the distal radius/ ulna.  no TTP along carpal bones / metacarpals except for mild TTP about the mass described above  - Neuro/Vasc: C5-T1 intact grossly, good pulses and good cap refill  - ROM: Full wrist flexion/extension/abduction/adduction  - Strength: 5/5 wrist flexion/extension. 5/5  strength  - Special: normal Phalen test, normal Tinel test       Assessment & Plan:   Skin of right earlobe with infection  Plan to treat simple, soft tissue infection with Keflex.  Strict return precautions given in case of worsening symptoms.  Patient declined referral to ENT but will monitor and follow-up as needed if considering surgical evaluation.    Ganglion cyst of dorsum of right wrist  Patient likely has a right ganglion cyst.  Patient offered watchful waiting with NSAID treatment, aspiration, or surgical evaluation.  She elects for orthopedic surgery referral for definitive treatment.  Follow-up as needed.      Followup: Return if symptoms worsen or fail to improve.    Christiano Fan D.O.    Please note that this dictation was created using voice recognition software. I have made every reasonable attempt to correct obvious errors, but I expect that there are errors of grammar and possibly content that I did not discover before finalizing the note.

## 2022-10-21 ENCOUNTER — OFFICE VISIT (OUTPATIENT)
Dept: URGENT CARE | Facility: CLINIC | Age: 27
End: 2022-10-21
Payer: COMMERCIAL

## 2022-10-21 VITALS
HEIGHT: 63 IN | TEMPERATURE: 97.5 F | SYSTOLIC BLOOD PRESSURE: 112 MMHG | HEART RATE: 92 BPM | BODY MASS INDEX: 21.4 KG/M2 | WEIGHT: 120.8 LBS | RESPIRATION RATE: 16 BRPM | OXYGEN SATURATION: 99 % | DIASTOLIC BLOOD PRESSURE: 70 MMHG

## 2022-10-21 DIAGNOSIS — H60.11: ICD-10-CM

## 2022-10-21 DIAGNOSIS — H60.01 ABSCESS OF EXTERNAL EAR, RIGHT: ICD-10-CM

## 2022-10-21 PROCEDURE — 99213 OFFICE O/P EST LOW 20 MIN: CPT | Performed by: FAMILY MEDICINE

## 2022-10-21 RX ORDER — SULFAMETHOXAZOLE AND TRIMETHOPRIM 800; 160 MG/1; MG/1
1 TABLET ORAL EVERY 12 HOURS
Qty: 14 TABLET | Refills: 0 | Status: SHIPPED | OUTPATIENT
Start: 2022-10-21 | End: 2022-10-28

## 2022-10-21 RX ORDER — IBUPROFEN 200 MG
600 TABLET ORAL ONCE
Status: COMPLETED | OUTPATIENT
Start: 2022-10-21 | End: 2022-10-21

## 2022-10-21 RX ORDER — AMOXICILLIN 500 MG/1
500 CAPSULE ORAL 3 TIMES DAILY
Qty: 21 CAPSULE | Refills: 0 | Status: SHIPPED | OUTPATIENT
Start: 2022-10-21 | End: 2022-10-28

## 2022-10-21 RX ADMIN — Medication 600 MG: at 21:26

## 2022-10-21 NOTE — LETTER
October 21, 2022         Patient: Samantha Fierro   YOB: 1995   Date of Visit: 10/21/2022           To Whom it May Concern:    Samantha Fierro was seen in my clinic on 10/21/2022. Please excuse from work 10/24/2022.      Sincerely,           Neftali Genao M.D.  Electronically Signed

## 2022-10-22 NOTE — PROGRESS NOTES
"Subjective     Samantha Fieror is a 27 y.o. female who presents with Follow-Up            Samantha was seen yesterday evening in the [place] emergency department.  Incision and drainage of chronic/recurrent right ear abscess was performed.  She is here for follow-up.  She has not started antibiotic yet.  She has been on several antibiotics in the past.  As far she knows it has never been cultured.  Initial symptoms started approximately 1 year ago associated with self piercing.  No fever.  No other aggravating or alleviating factors.      ROS           Objective     /70   Pulse 92   Temp 36.4 °C (97.5 °F) (Temporal)   Resp 16   Ht 1.6 m (5' 3\")   Wt 54.8 kg (120 lb 12.8 oz)   SpO2 99%   BMI 21.40 kg/m²      Physical Exam  Constitutional:       Appearance: Normal appearance.   HENT:      Ears:     Skin:     General: Skin is warm and dry.   Neurological:      Mental Status: She is alert.                           Assessment & Plan        1. Abscess of external ear, right    - amoxicillin (AMOXIL) 500 MG Cap; Take 1 Capsule by mouth 3 times a day for 7 days.  Dispense: 21 Capsule; Refill: 0  - sulfamethoxazole-trimethoprim (BACTRIM DS) 800-160 MG tablet; Take 1 Tablet by mouth every 12 hours for 7 days.  Dispense: 14 Tablet; Refill: 0    2. Cellulitis of pinna, right    - amoxicillin (AMOXIL) 500 MG Cap; Take 1 Capsule by mouth 3 times a day for 7 days.  Dispense: 21 Capsule; Refill: 0  - sulfamethoxazole-trimethoprim (BACTRIM DS) 800-160 MG tablet; Take 1 Tablet by mouth every 12 hours for 7 days.  Dispense: 14 Tablet; Refill: 0     Differential diagnosis, natural history, supportive care, and indications for immediate follow-up discussed at length.     Wound irrigated and dressed in clinic.  Initiate antibiotic.  Follow-up as needed.             "

## 2022-11-11 ENCOUNTER — OFFICE VISIT (OUTPATIENT)
Dept: MEDICAL GROUP | Facility: CLINIC | Age: 27
End: 2022-11-11
Payer: COMMERCIAL

## 2022-11-11 VITALS
TEMPERATURE: 98 F | WEIGHT: 124 LBS | HEART RATE: 92 BPM | BODY MASS INDEX: 22.82 KG/M2 | OXYGEN SATURATION: 96 % | SYSTOLIC BLOOD PRESSURE: 112 MMHG | DIASTOLIC BLOOD PRESSURE: 76 MMHG | HEIGHT: 62 IN

## 2022-11-11 DIAGNOSIS — H60.391 SKIN OF RIGHT EARLOBE WITH INFECTION: ICD-10-CM

## 2022-11-11 PROCEDURE — 99213 OFFICE O/P EST LOW 20 MIN: CPT | Mod: GE | Performed by: STUDENT IN AN ORGANIZED HEALTH CARE EDUCATION/TRAINING PROGRAM

## 2022-11-11 NOTE — ASSESSMENT & PLAN NOTE
No active infection. Resolving site of scar tissue present over superior aspect of pinna one slightly enlarged posterior auricular LN.       As far as scar tissue removal, referral to dermatology was offered and patient declined as she was reassured that there was no active infection and we do not anticipate lesion to enlarge or worsen unless it becomes infected again.     No keloid for intralesional injection.   No need for ABX

## 2022-11-11 NOTE — PROGRESS NOTES
"Subjective:     CC: ear pinna infection follow up    HPI:   Samantha presents today with lump to ear following infection    Problem   Skin of Right Earlobe With Infection    Piercing site infection recurrently over the past year with purulent drainage occasionally she had 1 course of antibiotics which worked well about 3 months ago and erythema but currently only with mild erythema.  Denies fevers.  Endorses a lymph node postauricularly but no other associated symptoms. Recently drained 2 weeks ago at ED.     No fevers, redness, drainage. No sore throat, decreased hearing, or neck pain. There is a persistent swelling that is firm over area of prior infection and drainage.     Today she is most interested in her cosmetic options for removal.          No current Epic-ordered outpatient medications on file.     No current Epic-ordered facility-administered medications on file.       ROS:  Gen: no fevers/chills, no changes in weight  Eyes: no changes in vision  ENT: no sore throat, no hearing loss, no bloody nose  Pulm: no sob, no cough  CV: no chest pain, no palpitations  GI: no nausea/vomiting, no diarrhea  : no dysuria  MSk: no myalgias  Skin: no rash  Neuro: no headaches, no numbness/tingling  Heme/Lymph: no easy bruising      Objective:     Exam:  /76   Pulse 92   Temp 36.7 °C (98 °F)   Ht 1.575 m (5' 2\")   Wt 56.2 kg (124 lb)   SpO2 96%   BMI 22.68 kg/m²  Body mass index is 22.68 kg/m².    Gen: Alert and oriented, No apparent distress.  Neck: Neck is supple without lymphadenopathy.    HEENT: normal bilateral external auditory canals, no neck swelling, no abnormality to oropharynx. 2 cm swelling to superior pinna that is non tender, firm, non fluctuant, no communication with surface of skin. One small 5mm posterior auricular LN that is firm and mobile and non tender. No associated erythema or drainage.     Lungs: Normal effort, CTA bilaterally, no wheezes, rhonchi, or rales  CV: Regular rate and " rhythm. No murmurs, rubs, or gallops.  Ext: No clubbing, cyanosis, edema.        Assessment & Plan:     27 y.o. female with the following -     Problem List Items Addressed This Visit       Skin of right earlobe with infection     No active infection. Resolving site of scar tissue present over superior aspect of pinna one slightly enlarged posterior auricular LN.       As far as scar tissue removal, referral to dermatology was offered and patient declined as she was reassured that there was no active infection and we do not anticipate lesion to enlarge or worsen unless it becomes infected again.     No keloid for intralesional injection.   No need for ABX

## 2024-02-04 ENCOUNTER — APPOINTMENT (OUTPATIENT)
Dept: RADIOLOGY | Facility: MEDICAL CENTER | Age: 29
End: 2024-02-04
Attending: EMERGENCY MEDICINE
Payer: COMMERCIAL

## 2024-02-04 ENCOUNTER — HOSPITAL ENCOUNTER (EMERGENCY)
Facility: MEDICAL CENTER | Age: 29
End: 2024-02-04
Attending: EMERGENCY MEDICINE
Payer: COMMERCIAL

## 2024-02-04 VITALS
TEMPERATURE: 96.7 F | HEART RATE: 94 BPM | DIASTOLIC BLOOD PRESSURE: 76 MMHG | BODY MASS INDEX: 24.59 KG/M2 | RESPIRATION RATE: 16 BRPM | OXYGEN SATURATION: 97 % | HEIGHT: 62 IN | SYSTOLIC BLOOD PRESSURE: 115 MMHG | WEIGHT: 133.6 LBS

## 2024-02-04 DIAGNOSIS — Z3A.32 32 WEEKS GESTATION OF PREGNANCY: ICD-10-CM

## 2024-02-04 DIAGNOSIS — R21 RASH: ICD-10-CM

## 2024-02-04 LAB
ALBUMIN SERPL BCP-MCNC: 3.6 G/DL (ref 3.2–4.9)
ALBUMIN/GLOB SERPL: 1 G/DL
ALP SERPL-CCNC: 168 U/L (ref 30–99)
ALT SERPL-CCNC: 51 U/L (ref 2–50)
ANION GAP SERPL CALC-SCNC: 15 MMOL/L (ref 7–16)
AST SERPL-CCNC: 48 U/L (ref 12–45)
BASOPHILS # BLD AUTO: 0 % (ref 0–1.8)
BASOPHILS # BLD: 0 K/UL (ref 0–0.12)
BILIRUB SERPL-MCNC: 0.3 MG/DL (ref 0.1–1.5)
BUN SERPL-MCNC: 5 MG/DL (ref 8–22)
BURR CELLS BLD QL SMEAR: NORMAL
CALCIUM ALBUM COR SERPL-MCNC: 9.6 MG/DL (ref 8.5–10.5)
CALCIUM SERPL-MCNC: 9.3 MG/DL (ref 8.5–10.5)
CHLORIDE SERPL-SCNC: 103 MMOL/L (ref 96–112)
CO2 SERPL-SCNC: 19 MMOL/L (ref 20–33)
CREAT SERPL-MCNC: 0.38 MG/DL (ref 0.5–1.4)
EOSINOPHIL # BLD AUTO: 0.21 K/UL (ref 0–0.51)
EOSINOPHIL NFR BLD: 1.7 % (ref 0–6.9)
ERYTHROCYTE [DISTWIDTH] IN BLOOD BY AUTOMATED COUNT: 47.1 FL (ref 35.9–50)
GFR SERPLBLD CREATININE-BSD FMLA CKD-EPI: 140 ML/MIN/1.73 M 2
GLOBULIN SER CALC-MCNC: 3.6 G/DL (ref 1.9–3.5)
GLUCOSE SERPL-MCNC: 101 MG/DL (ref 65–99)
HCT VFR BLD AUTO: 33.8 % (ref 37–47)
HGB BLD-MCNC: 11 G/DL (ref 12–16)
LYMPHOCYTES # BLD AUTO: 0.73 K/UL (ref 1–4.8)
LYMPHOCYTES NFR BLD: 6 % (ref 22–41)
MANUAL DIFF BLD: NORMAL
MCH RBC QN AUTO: 29.8 PG (ref 27–33)
MCHC RBC AUTO-ENTMCNC: 32.5 G/DL (ref 32.2–35.5)
MCV RBC AUTO: 91.6 FL (ref 81.4–97.8)
MICROCYTES BLD QL SMEAR: ABNORMAL
MONOCYTES # BLD AUTO: 0.52 K/UL (ref 0–0.85)
MONOCYTES NFR BLD AUTO: 4.3 % (ref 0–13.4)
MORPHOLOGY BLD-IMP: NORMAL
MYELOCYTES NFR BLD MANUAL: 0.9 %
NEUTROPHILS # BLD AUTO: 10.54 K/UL (ref 1.82–7.42)
NEUTROPHILS NFR BLD: 86.2 % (ref 44–72)
NEUTS BAND NFR BLD MANUAL: 0.9 % (ref 0–10)
NRBC # BLD AUTO: 0 K/UL
NRBC BLD-RTO: 0 /100 WBC (ref 0–0.2)
PLATELET # BLD AUTO: 268 K/UL (ref 164–446)
PLATELET BLD QL SMEAR: NORMAL
PMV BLD AUTO: 10.6 FL (ref 9–12.9)
POTASSIUM SERPL-SCNC: 3.6 MMOL/L (ref 3.6–5.5)
PROT SERPL-MCNC: 7.2 G/DL (ref 6–8.2)
RBC # BLD AUTO: 3.69 M/UL (ref 4.2–5.4)
RBC BLD AUTO: PRESENT
SODIUM SERPL-SCNC: 137 MMOL/L (ref 135–145)
WBC # BLD AUTO: 12.1 K/UL (ref 4.8–10.8)

## 2024-02-04 PROCEDURE — A9270 NON-COVERED ITEM OR SERVICE: HCPCS | Mod: UD | Performed by: EMERGENCY MEDICINE

## 2024-02-04 PROCEDURE — 80053 COMPREHEN METABOLIC PANEL: CPT

## 2024-02-04 PROCEDURE — 85007 BL SMEAR W/DIFF WBC COUNT: CPT

## 2024-02-04 PROCEDURE — 85027 COMPLETE CBC AUTOMATED: CPT

## 2024-02-04 PROCEDURE — 700102 HCHG RX REV CODE 250 W/ 637 OVERRIDE(OP): Mod: UD | Performed by: EMERGENCY MEDICINE

## 2024-02-04 PROCEDURE — 36415 COLL VENOUS BLD VENIPUNCTURE: CPT

## 2024-02-04 PROCEDURE — 99285 EMERGENCY DEPT VISIT HI MDM: CPT

## 2024-02-04 PROCEDURE — 76705 ECHO EXAM OF ABDOMEN: CPT

## 2024-02-04 RX ORDER — DIPHENHYDRAMINE HCL 25 MG
25 TABLET ORAL ONCE
Status: COMPLETED | OUTPATIENT
Start: 2024-02-04 | End: 2024-02-04

## 2024-02-04 RX ADMIN — DIPHENHYDRAMINE HYDROCHLORIDE 25 MG: 25 TABLET ORAL at 13:22

## 2024-02-04 ASSESSMENT — LIFESTYLE VARIABLES: DO YOU DRINK ALCOHOL: NO

## 2024-02-04 NOTE — DISCHARGE INSTRUCTIONS
Please use Benadryl for itching, you may take up to 2 tablets every 6 hours.  Please see your OB doctor on Thursday for follow-up as already scheduled.    Return for any change or worsening symptoms.

## 2024-02-04 NOTE — ED NOTES
Discharge instructions given.  All questions answered.  Pt to follow-up with OB/GYN, return to ER if symptoms worsen as discussed.  Pt verbalized understanding.  All belongings with pt.  Pt ambulated to lobby.

## 2024-02-04 NOTE — ED TRIAGE NOTES
Chief Complaint   Patient presents with    Itching     Pt reports itchiness since yesterday, starting in palms of hand and bottom of feet. Pt is 32 weeks pregnant.        Pt to triage with steady gait for above complaint. Presents with itching all over body, no apparent rash, palms are reddened.    Pt back to lobby, educated on triage process and encourage to alert staff of any changes.     Vitals:    02/04/24 1248   BP: 124/86   Pulse: (!) 106   Resp: 16   Temp: 36.8 °C (98.2 °F)   SpO2: 92%

## 2024-02-04 NOTE — ED PROVIDER NOTES
"ER Provider Note    Scribed for Bobo Leonard D.O. by Katie Rivera. 2/4/2024  1:09 PM    Primary Care Provider: None noted    CHIEF COMPLAINT  Chief Complaint   Patient presents with    Itching     Pt reports itchiness since yesterday, starting in palms of hand and bottom of feet. Pt is 32 weeks pregnant.      HPI/ROS    Samantha Fierro is a 28 y.o. female who presents to the Emergency Department for itchiness onset yesterday. Patient is currently 32 weeks pregnant. She states her itchiness began on her hands and feet, which has progressed to all over her body. Associated symptoms include nausea. Denies diarrhea or vomiting.     ROS as per HPI.    PAST MEDICAL HISTORY  History reviewed. No pertinent past medical history.    SURGICAL HISTORY  History reviewed. No pertinent surgical history.    FAMILY HISTORY  No pertinent family history     SOCIAL HISTORY   reports that she has never smoked. She has never used smokeless tobacco. She reports that she does not drink alcohol and does not use drugs.    CURRENT MEDICATIONS  No current outpatient medications    ALLERGIES  Patient has no known allergies.    PHYSICAL EXAM  /86   Pulse (!) 106   Temp 36.8 °C (98.2 °F) (Temporal)   Resp 16   Ht 1.575 m (5' 2\")   Wt 60.6 kg (133 lb 9.6 oz)   SpO2 92%   BMI 24.44 kg/m²     General: No acute distress.  HENT: Normocephalic, Mucus membranes are moist.   Chest: Lungs have even and unlabored respirations, Clear to auscultation.   Cardiovascular: Regular rate and regular rhythm, No peripheral cyanosis.  Abdomen: Non distended. Fundus height is above the umbilicus.   Skin: erythema to the palms, no other rashes noted.   Neuro: Awake, Conversive, Able to relay recent events.  Psychiatric: Calm and cooperative.     INITIAL ASSESSMENT  Patient has itching while pregnant, this may be related to pregnancy or non specific dermatitis. Concern for cholestasis will evaluate with labs and ultrasound.     ED " Observation Status? No; Patient does not meet criteria for ED Observation.     DIAGNOSTIC STUDIES  Labs:   Results for orders placed or performed during the hospital encounter of 02/04/24   CBC WITH DIFFERENTIAL   Result Value Ref Range    WBC 12.1 (H) 4.8 - 10.8 K/uL    RBC 3.69 (L) 4.20 - 5.40 M/uL    Hemoglobin 11.0 (L) 12.0 - 16.0 g/dL    Hematocrit 33.8 (L) 37.0 - 47.0 %    MCV 91.6 81.4 - 97.8 fL    MCH 29.8 27.0 - 33.0 pg    MCHC 32.5 32.2 - 35.5 g/dL    RDW 47.1 35.9 - 50.0 fL    Platelet Count 268 164 - 446 K/uL    MPV 10.6 9.0 - 12.9 fL    Neutrophils-Polys 86.20 (H) 44.00 - 72.00 %    Lymphocytes 6.00 (L) 22.00 - 41.00 %    Monocytes 4.30 0.00 - 13.40 %    Eosinophils 1.70 0.00 - 6.90 %    Basophils 0.00 0.00 - 1.80 %    Nucleated RBC 0.00 0.00 - 0.20 /100 WBC    Neutrophils (Absolute) 10.54 (H) 1.82 - 7.42 K/uL    Lymphs (Absolute) 0.73 (L) 1.00 - 4.80 K/uL    Monos (Absolute) 0.52 0.00 - 0.85 K/uL    Eos (Absolute) 0.21 0.00 - 0.51 K/uL    Baso (Absolute) 0.00 0.00 - 0.12 K/uL    NRBC (Absolute) 0.00 K/uL    Microcytosis 2+ (A)    COMP METABOLIC PANEL   Result Value Ref Range    Sodium 137 135 - 145 mmol/L    Potassium 3.6 3.6 - 5.5 mmol/L    Chloride 103 96 - 112 mmol/L    Co2 19 (L) 20 - 33 mmol/L    Anion Gap 15.0 7.0 - 16.0    Glucose 101 (H) 65 - 99 mg/dL    Bun 5 (L) 8 - 22 mg/dL    Creatinine 0.38 (L) 0.50 - 1.40 mg/dL    Calcium 9.3 8.5 - 10.5 mg/dL    Correct Calcium 9.6 8.5 - 10.5 mg/dL    AST(SGOT) 48 (H) 12 - 45 U/L    ALT(SGPT) 51 (H) 2 - 50 U/L    Alkaline Phosphatase 168 (H) 30 - 99 U/L    Total Bilirubin 0.3 0.1 - 1.5 mg/dL    Albumin 3.6 3.2 - 4.9 g/dL    Total Protein 7.2 6.0 - 8.2 g/dL    Globulin 3.6 (H) 1.9 - 3.5 g/dL    A-G Ratio 1.0 g/dL   ESTIMATED GFR   Result Value Ref Range    GFR (CKD-EPI) 140 >60 mL/min/1.73 m 2   DIFFERENTIAL MANUAL   Result Value Ref Range    Bands-Stabs 0.90 0.00 - 10.00 %    Myelocytes 0.90 %    Manual Diff Status PERFORMED    PERIPHERAL SMEAR REVIEW    Result Value Ref Range    Peripheral Smear Review see below    PLATELET ESTIMATE   Result Value Ref Range    Plt Estimation Normal    MORPHOLOGY   Result Value Ref Range    RBC Morphology Present     Echinocytes 1+      Radiology:   The attending emergency physician has independently interpreted the diagnostic imaging associated with this visit and am waiting the final reading from the radiologist.   Preliminary interpretation is as follows: No acute abnormalities   Radiologist interpretation:   US-RUQ   Final Result      No acute sonographic abnormality. No gallstones.        COURSE & MEDICAL DECISION MAKING     COURSE AND PLAN  1:13 PM - Patient was seen and evaluated at bedside. Patient presents to the ED for itching.  After my exam, I discussed with the patient the plan of care, which includes obtaining lab work and imaging for further evaluation. Patient understands and verbalizes agreement to plan of care. Ordered US RUQ, CBC w/ diff and CMP to evaluate.     3:02 PM - Patient was reevaluated at bedside. Itching imporved with benadryl, I can find no specific cause for this rash and can suspect it is related to pregnancy. Patient is to use benadryl for itching and follow up with her OB as planned. I then informed the patient of my plan for discharge, which includes strict return precautions for any new or worsening symptoms. Patient understands and verbalizes agreement to plan of care. Patient is comfortable going home at this time.     ED Summary: Patient presents with itching, she is 32 weeks pregnant.  There is some concerns for cholestasis.  Her liver enzymes are mildly elevated, the bilirubin is not elevated and ultrasound is negative.  She was medicated with Benadryl which did improve her itching, this is safe for pregnancy and she has an appointment on Thursday to follow-up with her OB doctor.  She is to return if any change or worsening symptoms.    DISPOSITION AND DISCUSSIONS  The patient will return  for new or worsening symptoms and is stable at the time of discharge.    DISPOSITION:  Patient will be discharged home in stable condition.    FOLLOW UP:    Please see your OB doctor on Thursday as scheduled  In 3 days      FINAL DIAGNOSIS  1. Rash    2. 32 weeks gestation of pregnancy      Katie BELLAMY (Scribe), am scribing for, and in the presence of, Bobo Leonard D.O..    Electronically signed by: Katie Rivera (Scribe), 2/4/2024    Bobo BELLAMY D.O. personally performed the services described in this documentation, as scribed by Katie Rivera in my presence, and it is both accurate and complete.     The note accurately reflects work and decisions made by me.  Bobo Leonard D.O.  2/4/2024  4:01 PM

## 2024-02-13 ENCOUNTER — APPOINTMENT (OUTPATIENT)
Dept: RADIOLOGY | Facility: MEDICAL CENTER | Age: 29
End: 2024-02-13
Attending: OBSTETRICS & GYNECOLOGY
Payer: COMMERCIAL

## 2024-02-13 ENCOUNTER — HOSPITAL ENCOUNTER (EMERGENCY)
Facility: MEDICAL CENTER | Age: 29
End: 2024-02-13
Attending: OBSTETRICS & GYNECOLOGY | Admitting: OBSTETRICS & GYNECOLOGY
Payer: COMMERCIAL

## 2024-02-13 VITALS
BODY MASS INDEX: 23.74 KG/M2 | TEMPERATURE: 96.6 F | SYSTOLIC BLOOD PRESSURE: 127 MMHG | OXYGEN SATURATION: 99 % | DIASTOLIC BLOOD PRESSURE: 84 MMHG | HEIGHT: 63 IN | RESPIRATION RATE: 19 BRPM | WEIGHT: 134 LBS | HEART RATE: 91 BPM

## 2024-02-13 LAB
ALBUMIN SERPL BCP-MCNC: 3.2 G/DL (ref 3.2–4.9)
ALBUMIN/GLOB SERPL: 0.8 G/DL
ALP SERPL-CCNC: 228 U/L (ref 30–99)
ALT SERPL-CCNC: 280 U/L (ref 2–50)
ANION GAP SERPL CALC-SCNC: 12 MMOL/L (ref 7–16)
AST SERPL-CCNC: 212 U/L (ref 12–45)
BASOPHILS # BLD AUTO: 0.4 % (ref 0–1.8)
BASOPHILS # BLD: 0.04 K/UL (ref 0–0.12)
BILIRUB SERPL-MCNC: 0.4 MG/DL (ref 0.1–1.5)
BUN SERPL-MCNC: 7 MG/DL (ref 8–22)
CALCIUM ALBUM COR SERPL-MCNC: 10.1 MG/DL (ref 8.5–10.5)
CALCIUM SERPL-MCNC: 9.5 MG/DL (ref 8.5–10.5)
CHLORIDE SERPL-SCNC: 104 MMOL/L (ref 96–112)
CO2 SERPL-SCNC: 20 MMOL/L (ref 20–33)
CREAT SERPL-MCNC: 0.43 MG/DL (ref 0.5–1.4)
EOSINOPHIL # BLD AUTO: 0.09 K/UL (ref 0–0.51)
EOSINOPHIL NFR BLD: 1 % (ref 0–6.9)
ERYTHROCYTE [DISTWIDTH] IN BLOOD BY AUTOMATED COUNT: 46.4 FL (ref 35.9–50)
GFR SERPLBLD CREATININE-BSD FMLA CKD-EPI: 136 ML/MIN/1.73 M 2
GLOBULIN SER CALC-MCNC: 4 G/DL (ref 1.9–3.5)
GLUCOSE SERPL-MCNC: 93 MG/DL (ref 65–99)
HCT VFR BLD AUTO: 30.9 % (ref 37–47)
HGB BLD-MCNC: 10.1 G/DL (ref 12–16)
IMM GRANULOCYTES # BLD AUTO: 0.24 K/UL (ref 0–0.11)
IMM GRANULOCYTES NFR BLD AUTO: 2.7 % (ref 0–0.9)
LYMPHOCYTES # BLD AUTO: 1.42 K/UL (ref 1–4.8)
LYMPHOCYTES NFR BLD: 15.9 % (ref 22–41)
MCH RBC QN AUTO: 30 PG (ref 27–33)
MCHC RBC AUTO-ENTMCNC: 32.7 G/DL (ref 32.2–35.5)
MCV RBC AUTO: 91.7 FL (ref 81.4–97.8)
MONOCYTES # BLD AUTO: 0.64 K/UL (ref 0–0.85)
MONOCYTES NFR BLD AUTO: 7.2 % (ref 0–13.4)
NEUTROPHILS # BLD AUTO: 6.5 K/UL (ref 1.82–7.42)
NEUTROPHILS NFR BLD: 72.8 % (ref 44–72)
NRBC # BLD AUTO: 0 K/UL
NRBC BLD-RTO: 0 /100 WBC (ref 0–0.2)
PLATELET # BLD AUTO: 298 K/UL (ref 164–446)
PMV BLD AUTO: 11.2 FL (ref 9–12.9)
POTASSIUM SERPL-SCNC: 3.6 MMOL/L (ref 3.6–5.5)
PROT SERPL-MCNC: 7.2 G/DL (ref 6–8.2)
RBC # BLD AUTO: 3.37 M/UL (ref 4.2–5.4)
SODIUM SERPL-SCNC: 136 MMOL/L (ref 135–145)
WBC # BLD AUTO: 8.9 K/UL (ref 4.8–10.8)

## 2024-02-13 PROCEDURE — 59025 FETAL NON-STRESS TEST: CPT | Mod: XU

## 2024-02-13 PROCEDURE — 80053 COMPREHEN METABOLIC PANEL: CPT

## 2024-02-13 PROCEDURE — 99284 EMERGENCY DEPT VISIT MOD MDM: CPT

## 2024-02-13 PROCEDURE — 85025 COMPLETE CBC W/AUTO DIFF WBC: CPT

## 2024-02-13 PROCEDURE — 76819 FETAL BIOPHYS PROFIL W/O NST: CPT

## 2024-02-13 PROCEDURE — 36415 COLL VENOUS BLD VENIPUNCTURE: CPT

## 2024-02-13 RX ORDER — URSODIOL 300 MG/1
300 CAPSULE ORAL 3 TIMES DAILY
Status: DISCONTINUED | OUTPATIENT
Start: 2024-02-13 | End: 2024-02-13 | Stop reason: HOSPADM

## 2024-02-13 ASSESSMENT — PAIN SCALES - GENERAL: PAINLEVEL: 0 - NO PAIN

## 2024-02-13 ASSESSMENT — FIBROSIS 4 INDEX: FIB4 SCORE: 0.68

## 2024-02-13 NOTE — PROGRESS NOTES
1430-Pt here from home with c/o decreased FM since 7am. Pt denies any contraction, leaking or bleeding. Pt placed on monitors (us and toco) POC discussed with pt. Pt states understanding and denies any questions.   1443-Dr Joe called, report given. FHT strip reviewed with MD. Orders received.   1721-Dr Joe called, updated regarding lab results. US pending results   1724-Monitors reapplied after ultrasound. (Us and toco).   1755- Dr Joe at bedside.   1825-Dr Joe reviewed strip. POC discussed. Discharge order received.   1830-Discharge instructions reviewed with pt. Pt states understanding and denies any questions. Pt has follow up appointment on Thursday with Dr Tena. Pt understands when to return for decreased FM, worsening symptoms or PTL.   1833-Pt discharged home with friends x2

## 2024-02-14 NOTE — ED PROVIDER NOTES
"Obstetric Emergency Department   Triage Assessment Note    ID: 28 y.o. is a  with IUP at 33w5d     Primary Obstetrician: [place] Tena M.D.    Assessing Obstetrician: Sj Joe MD    CC: decreased fetal movement    HPI: This 28-year-old -0-4-2 with intrauterine pregnancy at 33 weeks 5 days gestational age by first trimester ultrasound presents to labor and delivery with decreased fetal movement.  The patient has a putative diagnosis of intrahepatic cholestasis of pregnancy, however, her initial  Since her arrival the movement has returned to normal.  She is feeling regular fetal movement currently.  She denies leaking of fluid or vaginal bleeding.    ROS: 10 systems negative except as noted above.    O: /84   Pulse 91   Temp 35.9 °C (96.6 °F) (Temporal)   Resp 19   Ht 1.6 m (5' 3\")   Wt 60.8 kg (134 lb)   SpO2 99%   BMI 23.74 kg/m²    Gen: NAD, AAO  HEENT: NC/AT, MMM  Pulm: no distress  Abd: Gravid, soft, uterus NTTP, no rebound/guarding  Skin: no rash, palms of hands mildly erythematous from patient scratching.  Neuro: no gross deficits, EOMI  Psy: mood good, affect normal and congruent  Pelvic: SVE deferred    NST Procedure   NST: 145/mod karen/+accels, -decels in last hour of monitoring (decel early in admission while settling in bed, without recurrence, and continue moderate variability throughout). Continuous reactivity thereafter.    Interpretation: reactive for GA  [place]: rare, 20+ min apart    Labs (ordered/reviewed by me):  CBC  Recent Labs     24  1505   WBC 8.9   RBC 3.37*   HEMOGLOBIN 10.1*   HEMATOCRIT 30.9*   MCV 91.7   MCH 30.0   RDW 46.4   PLATELETCT 298   MPV 11.2   NEUTSPOLYS 72.80*   LYMPHOCYTES 15.90*   MONOCYTES 7.20   EOSINOPHILS 1.00   BASOPHILS 0.40         [place]  Recent Labs     24  1505   SODIUM 136   POTASSIUM 3.6   CHLORIDE 104   CO2 20   GLUCOSE 93   BUN 7*     Recent Labs     24  1505   ALBUMIN 3.2   TBILIRUBIN 0.4   ALKPHOSPHAT 228* "   TOTPROTEIN 7.2   ALTSGPT 280*   ASTSGOT 212*   CREATININE 0.43*          Imaging (images reviewed independently by me):  US-BIOPHYSICAL PROFILE   Final Result         1. Normal biophysical profile ultrasound.                  A/P: Samantha Fierro is a 28 y.o.  at 33w5d who presents for resolved decreased fetal movement in the setting of suspected intra cholestasis of pregnancy.  Reactive NST.    *Decreased fetal movement: Now resolved.  Patient feeling normal fetal movement currently.  Reactive NST, initial wandering baseline vs decel without contraction.  Remainder of the tracing showed reactivity without further decelerations.  A BPP was performed that was 8 out of 8.    -Strict fetal movement precautions were given.    *Suspected intrahepatic cholestasis of pregnancy: Evaluated a couple weeks ago in the emergency department without being sent to labor and delivery and without sending of bile acids.  These were sent Thursday after she presented back to clinic.  Liver enzymes were elevated then and have increased, however, given inability to  ursodiol until her first dose last night, I am not surprised that the liver enzymes today are further elevated from her last labs.  -Repeat bile acids to determine peak or sent, understanding that these too will not be back for about a week   -Delivery timing depending on peak bile acids.   -Continue ursodiol 300mg PO BID as Rx'd.   -Twice weekly NSTs in the office, already scheduled    Dispo: Okay for continued outpatient care.  Strict fetal movement precautions given.  Continue ursodiol and follow-up as scheduled on Thursday with Dr. Tena.      Sj Joe MD, MS, FACOG   2024, 6:05 PM   [facility]   [phone]        ADDENDUM: I spoke to the  myself about whether they had blood from the prior draw to send for bile acids.  The tech told me they did, and that it would a serum separator tube which they had.  We were  contacted by the lab an hour after this conversation, approximately 15 to 20 minutes after the patient had been discharged home, stating that they do not have the right tube for the sample.    Sj Joe MD, MS, FACOG   2/13/2024, 6:46 PM     [facility]   [phone]

## 2024-02-14 NOTE — DISCHARGE INSTRUCTIONS
Cholestasis of Pregnancy    Cholestasis of pregnancy is a condition that causes bile, a digestive fluid produced by the liver, to build up in the liver during pregnancy. This causes problems with liver function. Cholestasis of pregnancy most often happens during the third trimester, but it can occur at any time during pregnancy. This condition often goes away soon after giving birth.  Cholestasis can be harmful to both you and your baby. Cholestasis may increase the risk of:  Your baby being born too early ( delivery).  Your baby passing meconium, or his or her first stool (feces), while you are still pregnant.  Serious or life-threatening illness for you during pregnancy that requires a hospital stay. This includes preeclampsia.  Losing your baby before delivery (stillbirth).  What are the causes?  The exact cause of this condition is not known, but it may be related to:  Pregnancy hormones. These are chemicals in the body that may cause the flow of bile to slow down and build up in your liver. A buildup of bile may cause problems with your liver function.  Genetic mutations, or changes in your genes. These changes may happen in the genes that affect how your liver releases bile.  What increases the risk?  You are more likely to develop this condition if:  You had cholestasis of pregnancy in the past.  You have a family history of cholestasis of pregnancy.  You have liver problems.  You are pregnant with multiples, such as twins or triplets.  What are the signs or symptoms?  The most common symptom is intense itching (pruritus), especially on the palms of your hands and soles of your feet. The itching can spread to the rest of your body and is often worse at night. Other symptoms may include:  Feeling tired.  Pain in your upper right abdomen.  Dark-colored urine.  Light-colored stool.  Poor appetite.  Skin or the white parts of your eyes turning yellow (jaundice).  How is this diagnosed?  This condition is  diagnosed based on:  Your medical history.  A physical exam.  Blood tests.  You may also have genetic testing if you have an inherited risk for developing this condition. An inherited risk is one that was passed to you from a parent.  How is this treated?  This condition is treated with ways to keep you comfortable and keep your baby safe. Your health care provider may:  Prescribe medicine to reduce bile acid in your bloodstream and relieve symptoms.  Give you vitamin K before delivery to prevent excessive bleeding.  Check your baby often (fetal monitoring).  Do regular blood tests to check your bile levels and liver function until your baby is delivered.  Recommend starting (inducing) your labor and delivery by week 36 or 37 of pregnancy, or as soon as your baby's lungs have developed enough.  Follow these instructions at home:  Take over-the-counter and prescription medicines only as told by your health care provider.  Eat a healthy diet that includes plenty of fruits and vegetables.  Wear comfortable, loose-fitting, cotton clothing to reduce itching.  Take cool baths to soothe itchy skin.  Keep all follow-up visits and prenatal visits. This is important.  Contact a health care provider if:  Your symptoms get worse, even with treatment.  You develop pain in your right side.  You have unusual swelling in your abdomen, feet, ankles, or legs.  You have a fever.  You have new nausea or vomiting.  Your stool is an abnormal color.  Get help right away if:  You go into early labor.  You have a headache that does not go away, or you have a headache and develop vision changes.  You are unable to eat or drink for 24 hours due to nausea or vomiting.  You have severe pain in your abdomen.  You have shortness of breath.  Your skin or the white parts of your eyes turn yellow.  Summary  Cholestasis of pregnancy most often happens during the third trimester, but it can occur at any time during your pregnancy.  The most common  symptom of cholestasis of pregnancy is intense itching (pruritus), especially on the palms of your hands and soles of your feet.  This condition may be treated with medicine, frequent monitoring of your baby, testing of your blood, or starting (inducing) labor and delivery by week 36 or 37 of pregnancy.  Keep all follow-up visits and prenatal visits. This is important.  This information is not intended to replace advice given to you by your health care provider. Make sure you discuss any questions you have with your health care provider.  Document Revised: 08/18/2021 Document Reviewed: 08/18/2021  [employer] Patient Education © 2023 [employer]

## 2024-02-19 ENCOUNTER — APPOINTMENT (OUTPATIENT)
Dept: RADIOLOGY | Facility: MEDICAL CENTER | Age: 29
End: 2024-02-19
Attending: OBSTETRICS & GYNECOLOGY
Payer: COMMERCIAL

## 2024-02-19 ENCOUNTER — HOSPITAL ENCOUNTER (OUTPATIENT)
Facility: MEDICAL CENTER | Age: 29
End: 2024-02-19
Attending: OBSTETRICS & GYNECOLOGY | Admitting: OBSTETRICS & GYNECOLOGY
Payer: COMMERCIAL

## 2024-02-19 VITALS
WEIGHT: 134 LBS | BODY MASS INDEX: 23.74 KG/M2 | SYSTOLIC BLOOD PRESSURE: 121 MMHG | RESPIRATION RATE: 20 BRPM | OXYGEN SATURATION: 97 % | TEMPERATURE: 97.1 F | DIASTOLIC BLOOD PRESSURE: 75 MMHG | HEART RATE: 94 BPM | HEIGHT: 63 IN

## 2024-02-19 LAB
ALBUMIN SERPL BCP-MCNC: 3.2 G/DL (ref 3.2–4.9)
ALBUMIN/GLOB SERPL: 1 G/DL
ALP SERPL-CCNC: 179 U/L (ref 30–99)
ALT SERPL-CCNC: 112 U/L (ref 2–50)
ANION GAP SERPL CALC-SCNC: 11 MMOL/L (ref 7–16)
AST SERPL-CCNC: 47 U/L (ref 12–45)
BILIRUB SERPL-MCNC: 0.2 MG/DL (ref 0.1–1.5)
BUN SERPL-MCNC: 7 MG/DL (ref 8–22)
CALCIUM ALBUM COR SERPL-MCNC: 9.7 MG/DL (ref 8.5–10.5)
CALCIUM SERPL-MCNC: 9.1 MG/DL (ref 8.5–10.5)
CHLORIDE SERPL-SCNC: 104 MMOL/L (ref 96–112)
CO2 SERPL-SCNC: 21 MMOL/L (ref 20–33)
CREAT SERPL-MCNC: 0.45 MG/DL (ref 0.5–1.4)
GFR SERPLBLD CREATININE-BSD FMLA CKD-EPI: 135 ML/MIN/1.73 M 2
GLOBULIN SER CALC-MCNC: 3.3 G/DL (ref 1.9–3.5)
GLUCOSE SERPL-MCNC: 88 MG/DL (ref 65–99)
POTASSIUM SERPL-SCNC: 3.8 MMOL/L (ref 3.6–5.5)
PROT SERPL-MCNC: 6.5 G/DL (ref 6–8.2)
SODIUM SERPL-SCNC: 136 MMOL/L (ref 135–145)

## 2024-02-19 PROCEDURE — 76819 FETAL BIOPHYS PROFIL W/O NST: CPT

## 2024-02-19 PROCEDURE — 36415 COLL VENOUS BLD VENIPUNCTURE: CPT

## 2024-02-19 PROCEDURE — 83789 MASS SPECTROMETRY QUAL/QUAN: CPT

## 2024-02-19 PROCEDURE — 59025 FETAL NON-STRESS TEST: CPT | Mod: XU

## 2024-02-19 PROCEDURE — 80053 COMPREHEN METABOLIC PANEL: CPT

## 2024-02-19 ASSESSMENT — FIBROSIS 4 INDEX: FIB4 SCORE: 1.15

## 2024-02-19 NOTE — PROGRESS NOTES
1003: Pt is a  at 34.4 weeks today, pt presents to L&D for scheduled NST due to cholestasis. Pt reports +FM, denies VB/LOF/Uc's at this time. EFM and TOCO applied, VS taken, pt comfortable in bed at this time.     1045: Dr Tena at bedside, FHT reviewed, orders received for BPP and labs, see orders for details.    1220: Dr Tena at bedside, results reviewed, FHT reviewed. Discharge order received, see orders for details.     1128:  Discharge instructions given including  labor precautions, UC's, ROM, VB, abn FM, when to return to L&D, f/u with Dr. Tena, pelvic rest and modified bedrest. Questions answered at length. Pt and FOB verbalized understanding and agreement with POC and discharge. Discharge instructions signed.

## 2024-02-19 NOTE — ED TRIAGE NOTES
OB ED note    Date: 2024    Patient ID: 28-year-old  7 para 2-0-4-2 at 34+4 weeks by ultrasound (EDC 3/28/2024)    Chief complaint: Scheduled NST for  monitoring due to cholestasis of pregnancy.    History of present illness: The patient has prenatal care with myself.  She was recently diagnosed with cholestasis of pregnancy.  Her bile acids resulted on Friday which show a level of 38.3.  She has been taking Actigall which was increased to 300 mg 3 times daily on Thursday.  She reports that there has been some improvement in the itching of her palms and soles.  She states that  does still persist.  She endorses positive fetal movement.  She denies vaginal bleeding or loss of fluid.  She denies contractions.    Physical exam:  General: Alert, conversational, pleasant, no acute distress cardiovascular: Regular rate  Pulmonary: Distress, symmetric expansion  Abdomen: Soft, nontender, nondistended, gravid  Genitourinary: Deferred  Extremities: Moves all, no edema    NST: Baseline 140s, moderate ability for follow accelerations, no decelerations, tocometer quiet    Imaging: BPP .     Laboratory studies: Repeat CMP and bile acids ordered today.  The patient's bile acids on  were 38.3.  They took about a week to result.  The patient had a CMP drawn on  that was 108 that showed an AST of 108 and ALT of 197.  She had repeat labs drawn on  which showed an AST of 126 and ALT of 234.  Assessment/plan:  28-year-old  7 para 2-0-4-2 at 34+4 weeks (EDC 3/28/2024)  1.   intrauterine pregnancy at 34+4 weeks.  2.  Cholestasis of pregnancy    Discussion: The patient was seen and evaluated at bedside.  Her repeat LFTs on Thursday showed an increase.  On Thursday her Actigall was increased to from 300 mg p.o. twice daily to 300 mg p.o. 3 times daily.  Given the patient continues to have some symptoms I recommended that she take an additional 150 mg in the morning and at night.  This will  give a dose of 1200 g/day which is about the max dose for her body weight.  A BPP was performed today which was 8/8.  Given the patient's elevated bile acids and upward trending LFTs I will plan to refer her to the high risk pregnancy center for further management.  A repeat CMP and bile acids were drawn today but are pending.  Strict fetal kick count precautions were discussed today.  The patient is aware of the increased risk of fetal demise.  She verbalized understanding.    Plan:  1.  Okay to discharge home.  2.  Labor precautions.  2.  Fetal kick counts.  4.  Follow-up on 2/22 for additional monitoring.  5.  Increase Actigall to 450 mg in the morning and evening and 300 mg at lunch.     [place] Tena MD

## 2024-02-23 LAB
BILE AC SERPL-SCNC: 22.3 UMOL/L (ref 0–7)
CDCAE SERPL-SCNC: 3.5 UMOL/L (ref 0–3.4)
CHOLATE SERPL-SCNC: 6.6 UMOL/L (ref 0–1.9)
DO-CHOLATE SERPL-SCNC: 2.2 UMOL/L (ref 0–2.5)
URSODEOXYCHOLATE SERPL-SCNC: 10 UMOL/L (ref 0–1)

## 2024-03-07 ENCOUNTER — ANESTHESIA EVENT (OUTPATIENT)
Dept: ANESTHESIOLOGY | Facility: MEDICAL CENTER | Age: 29
End: 2024-03-07
Payer: COMMERCIAL

## 2024-03-07 ENCOUNTER — ANESTHESIA (OUTPATIENT)
Dept: ANESTHESIOLOGY | Facility: MEDICAL CENTER | Age: 29
End: 2024-03-07
Payer: COMMERCIAL

## 2024-03-07 ENCOUNTER — HOSPITAL ENCOUNTER (INPATIENT)
Facility: MEDICAL CENTER | Age: 29
LOS: 2 days | End: 2024-03-09
Attending: OBSTETRICS & GYNECOLOGY | Admitting: PEDIATRICS
Payer: COMMERCIAL

## 2024-03-07 ENCOUNTER — APPOINTMENT (OUTPATIENT)
Dept: OBGYN | Facility: MEDICAL CENTER | Age: 29
End: 2024-03-07
Attending: OBSTETRICS & GYNECOLOGY
Payer: COMMERCIAL

## 2024-03-07 LAB
BASOPHILS # BLD AUTO: 0.3 % (ref 0–1.8)
BASOPHILS # BLD: 0.04 K/UL (ref 0–0.12)
EOSINOPHIL # BLD AUTO: 0.11 K/UL (ref 0–0.51)
EOSINOPHIL NFR BLD: 0.9 % (ref 0–6.9)
ERYTHROCYTE [DISTWIDTH] IN BLOOD BY AUTOMATED COUNT: 50.1 FL (ref 35.9–50)
HCT VFR BLD AUTO: 31.8 % (ref 37–47)
HGB BLD-MCNC: 10.5 G/DL (ref 12–16)
HOLDING TUBE BB 8507: NORMAL
IMM GRANULOCYTES # BLD AUTO: 0.42 K/UL (ref 0–0.11)
IMM GRANULOCYTES NFR BLD AUTO: 3.5 % (ref 0–0.9)
LYMPHOCYTES # BLD AUTO: 1.6 K/UL (ref 1–4.8)
LYMPHOCYTES NFR BLD: 13.3 % (ref 22–41)
MCH RBC QN AUTO: 30.3 PG (ref 27–33)
MCHC RBC AUTO-ENTMCNC: 33 G/DL (ref 32.2–35.5)
MCV RBC AUTO: 91.9 FL (ref 81.4–97.8)
MONOCYTES # BLD AUTO: 0.84 K/UL (ref 0–0.85)
MONOCYTES NFR BLD AUTO: 7 % (ref 0–13.4)
NEUTROPHILS # BLD AUTO: 9.04 K/UL (ref 1.82–7.42)
NEUTROPHILS NFR BLD: 75 % (ref 44–72)
NRBC # BLD AUTO: 0 K/UL
NRBC BLD-RTO: 0 /100 WBC (ref 0–0.2)
PLATELET # BLD AUTO: 247 K/UL (ref 164–446)
PMV BLD AUTO: 11.7 FL (ref 9–12.9)
RBC # BLD AUTO: 3.46 M/UL (ref 4.2–5.4)
T PALLIDUM AB SER QL IA: NORMAL
WBC # BLD AUTO: 12.1 K/UL (ref 4.8–10.8)

## 2024-03-07 PROCEDURE — 700111 HCHG RX REV CODE 636 W/ 250 OVERRIDE (IP): Mod: JZ | Performed by: ANESTHESIOLOGY

## 2024-03-07 PROCEDURE — 86780 TREPONEMA PALLIDUM: CPT

## 2024-03-07 PROCEDURE — 700111 HCHG RX REV CODE 636 W/ 250 OVERRIDE (IP): Mod: JZ | Performed by: OBSTETRICS & GYNECOLOGY

## 2024-03-07 PROCEDURE — 36415 COLL VENOUS BLD VENIPUNCTURE: CPT

## 2024-03-07 PROCEDURE — 303615 HCHG EPIDURAL/SPINAL ANESTHESIA FOR LABOR

## 2024-03-07 PROCEDURE — 85025 COMPLETE CBC W/AUTO DIFF WBC: CPT

## 2024-03-07 PROCEDURE — 3E033VJ INTRODUCTION OF OTHER HORMONE INTO PERIPHERAL VEIN, PERCUTANEOUS APPROACH: ICD-10-PCS | Performed by: OBSTETRICS & GYNECOLOGY

## 2024-03-07 PROCEDURE — 770002 HCHG ROOM/CARE - OB PRIVATE (112)

## 2024-03-07 PROCEDURE — 700101 HCHG RX REV CODE 250: Performed by: ANESTHESIOLOGY

## 2024-03-07 PROCEDURE — 700105 HCHG RX REV CODE 258: Performed by: OBSTETRICS & GYNECOLOGY

## 2024-03-07 RX ORDER — ROPIVACAINE HYDROCHLORIDE 2 MG/ML
INJECTION, SOLUTION EPIDURAL; INFILTRATION; PERINEURAL CONTINUOUS
Status: DISCONTINUED | OUTPATIENT
Start: 2024-03-07 | End: 2024-03-09 | Stop reason: HOSPADM

## 2024-03-07 RX ORDER — OXYTOCIN 10 [USP'U]/ML
10 INJECTION, SOLUTION INTRAMUSCULAR; INTRAVENOUS
Status: DISCONTINUED | OUTPATIENT
Start: 2024-03-07 | End: 2024-03-08 | Stop reason: HOSPADM

## 2024-03-07 RX ORDER — ROPIVACAINE HYDROCHLORIDE 2 MG/ML
INJECTION, SOLUTION EPIDURAL; INFILTRATION; PERINEURAL
Status: ACTIVE
Start: 2024-03-07 | End: 2024-03-08

## 2024-03-07 RX ORDER — SODIUM CHLORIDE, SODIUM LACTATE, POTASSIUM CHLORIDE, AND CALCIUM CHLORIDE .6; .31; .03; .02 G/100ML; G/100ML; G/100ML; G/100ML
1000 INJECTION, SOLUTION INTRAVENOUS
Status: DISCONTINUED | OUTPATIENT
Start: 2024-03-07 | End: 2024-03-08 | Stop reason: HOSPADM

## 2024-03-07 RX ORDER — MISOPROSTOL 200 UG/1
800 TABLET ORAL
Status: DISCONTINUED | OUTPATIENT
Start: 2024-03-07 | End: 2024-03-08 | Stop reason: HOSPADM

## 2024-03-07 RX ORDER — FENUGREEK SEED/BL.THISTLE/ANIS 340 MG
CAPSULE ORAL
COMMUNITY

## 2024-03-07 RX ORDER — TERBUTALINE SULFATE 1 MG/ML
0.25 INJECTION, SOLUTION SUBCUTANEOUS
Status: DISCONTINUED | OUTPATIENT
Start: 2024-03-07 | End: 2024-03-08 | Stop reason: HOSPADM

## 2024-03-07 RX ORDER — CARBOPROST TROMETHAMINE 250 UG/ML
250 INJECTION, SOLUTION INTRAMUSCULAR
Status: DISCONTINUED | OUTPATIENT
Start: 2024-03-07 | End: 2024-03-08 | Stop reason: HOSPADM

## 2024-03-07 RX ORDER — ACETAMINOPHEN 500 MG
1000 TABLET ORAL
Status: COMPLETED | OUTPATIENT
Start: 2024-03-07 | End: 2024-03-08

## 2024-03-07 RX ORDER — IBUPROFEN 800 MG/1
800 TABLET ORAL
Status: DISCONTINUED | OUTPATIENT
Start: 2024-03-07 | End: 2024-03-08 | Stop reason: HOSPADM

## 2024-03-07 RX ORDER — LIDOCAINE HYDROCHLORIDE AND EPINEPHRINE 15; 5 MG/ML; UG/ML
INJECTION, SOLUTION EPIDURAL
Status: COMPLETED | OUTPATIENT
Start: 2024-03-07 | End: 2024-03-07

## 2024-03-07 RX ORDER — ONDANSETRON 2 MG/ML
4 INJECTION INTRAMUSCULAR; INTRAVENOUS EVERY 6 HOURS PRN
Status: DISCONTINUED | OUTPATIENT
Start: 2024-03-07 | End: 2024-03-08 | Stop reason: HOSPADM

## 2024-03-07 RX ORDER — METHYLERGONOVINE MALEATE 0.2 MG/ML
0.2 INJECTION INTRAVENOUS
Status: DISCONTINUED | OUTPATIENT
Start: 2024-03-07 | End: 2024-03-08 | Stop reason: HOSPADM

## 2024-03-07 RX ORDER — ONDANSETRON 4 MG/1
4 TABLET, ORALLY DISINTEGRATING ORAL EVERY 6 HOURS PRN
Status: DISCONTINUED | OUTPATIENT
Start: 2024-03-07 | End: 2024-03-08 | Stop reason: HOSPADM

## 2024-03-07 RX ORDER — SODIUM CHLORIDE, SODIUM LACTATE, POTASSIUM CHLORIDE, AND CALCIUM CHLORIDE .6; .31; .03; .02 G/100ML; G/100ML; G/100ML; G/100ML
250 INJECTION, SOLUTION INTRAVENOUS PRN
Status: DISCONTINUED | OUTPATIENT
Start: 2024-03-07 | End: 2024-03-08 | Stop reason: HOSPADM

## 2024-03-07 RX ORDER — SODIUM CHLORIDE, SODIUM LACTATE, POTASSIUM CHLORIDE, CALCIUM CHLORIDE 600; 310; 30; 20 MG/100ML; MG/100ML; MG/100ML; MG/100ML
INJECTION, SOLUTION INTRAVENOUS CONTINUOUS
Status: DISCONTINUED | OUTPATIENT
Start: 2024-03-07 | End: 2024-03-09 | Stop reason: HOSPADM

## 2024-03-07 RX ORDER — LIDOCAINE HYDROCHLORIDE 10 MG/ML
20 INJECTION, SOLUTION INFILTRATION; PERINEURAL
Status: DISCONTINUED | OUTPATIENT
Start: 2024-03-07 | End: 2024-03-08 | Stop reason: HOSPADM

## 2024-03-07 RX ORDER — EPHEDRINE SULFATE 50 MG/ML
5 INJECTION, SOLUTION INTRAVENOUS
Status: DISCONTINUED | OUTPATIENT
Start: 2024-03-07 | End: 2024-03-08 | Stop reason: HOSPADM

## 2024-03-07 RX ADMIN — LIDOCAINE HYDROCHLORIDE,EPINEPHRINE BITARTRATE 3 ML: 15; .005 INJECTION, SOLUTION EPIDURAL; INFILTRATION; INTRACAUDAL; PERINEURAL at 20:18

## 2024-03-07 RX ADMIN — OXYTOCIN 1 MILLI-UNITS/MIN: 10 INJECTION, SOLUTION INTRAMUSCULAR; INTRAVENOUS at 17:57

## 2024-03-07 RX ADMIN — LIDOCAINE HYDROCHLORIDE,EPINEPHRINE BITARTRATE 2 ML: 15; .005 INJECTION, SOLUTION EPIDURAL; INFILTRATION; INTRACAUDAL; PERINEURAL at 20:22

## 2024-03-07 RX ADMIN — SODIUM CHLORIDE, POTASSIUM CHLORIDE, SODIUM LACTATE AND CALCIUM CHLORIDE: 600; 310; 30; 20 INJECTION, SOLUTION INTRAVENOUS at 17:55

## 2024-03-07 RX ADMIN — ROPIVACAINE HYDROCHLORIDE: 2 INJECTION, SOLUTION EPIDURAL; INFILTRATION at 20:29

## 2024-03-07 RX ADMIN — SODIUM CHLORIDE, POTASSIUM CHLORIDE, SODIUM LACTATE AND CALCIUM CHLORIDE: 600; 310; 30; 20 INJECTION, SOLUTION INTRAVENOUS at 20:30

## 2024-03-07 ASSESSMENT — PATIENT HEALTH QUESTIONNAIRE - PHQ9
1. LITTLE INTEREST OR PLEASURE IN DOING THINGS: NOT AT ALL
2. FEELING DOWN, DEPRESSED, IRRITABLE, OR HOPELESS: NOT AT ALL
SUM OF ALL RESPONSES TO PHQ9 QUESTIONS 1 AND 2: 0

## 2024-03-07 ASSESSMENT — LIFESTYLE VARIABLES
EVER FELT BAD OR GUILTY ABOUT YOUR DRINKING: NO
EVER HAD A DRINK FIRST THING IN THE MORNING TO STEADY YOUR NERVES TO GET RID OF A HANGOVER: NO
HOW MANY TIMES IN THE PAST YEAR HAVE YOU HAD 5 OR MORE DRINKS IN A DAY: 0
TOTAL SCORE: 0
ON A TYPICAL DAY WHEN YOU DRINK ALCOHOL HOW MANY DRINKS DO YOU HAVE: 0
TOTAL SCORE: 0
ALCOHOL_USE: NO
CONSUMPTION TOTAL: NEGATIVE
HAVE YOU EVER FELT YOU SHOULD CUT DOWN ON YOUR DRINKING: NO
TOTAL SCORE: 0
HAVE PEOPLE ANNOYED YOU BY CRITICIZING YOUR DRINKING: NO
EVER_SMOKED: NEVER
AVERAGE NUMBER OF DAYS PER WEEK YOU HAVE A DRINK CONTAINING ALCOHOL: 0

## 2024-03-07 ASSESSMENT — FIBROSIS 4 INDEX: FIB4 SCORE: 0.4

## 2024-03-07 ASSESSMENT — PAIN SCALES - GENERAL: PAINLEVEL: 0 - NO PAIN

## 2024-03-08 LAB
ERYTHROCYTE [DISTWIDTH] IN BLOOD BY AUTOMATED COUNT: 51.2 FL (ref 35.9–50)
HCT VFR BLD AUTO: 27.7 % (ref 37–47)
HGB BLD-MCNC: 9.4 G/DL (ref 12–16)
MCH RBC QN AUTO: 31.8 PG (ref 27–33)
MCHC RBC AUTO-ENTMCNC: 33.9 G/DL (ref 32.2–35.5)
MCV RBC AUTO: 93.6 FL (ref 81.4–97.8)
PLATELET # BLD AUTO: 211 K/UL (ref 164–446)
PMV BLD AUTO: 11.4 FL (ref 9–12.9)
RBC # BLD AUTO: 2.96 M/UL (ref 4.2–5.4)
WBC # BLD AUTO: 14.4 K/UL (ref 4.8–10.8)

## 2024-03-08 PROCEDURE — 0UQMXZZ REPAIR VULVA, EXTERNAL APPROACH: ICD-10-PCS | Performed by: OBSTETRICS & GYNECOLOGY

## 2024-03-08 PROCEDURE — 700111 HCHG RX REV CODE 636 W/ 250 OVERRIDE (IP): Mod: JZ | Performed by: ANESTHESIOLOGY

## 2024-03-08 PROCEDURE — A9270 NON-COVERED ITEM OR SERVICE: HCPCS | Performed by: OBSTETRICS & GYNECOLOGY

## 2024-03-08 PROCEDURE — 304965 HCHG RECOVERY SERVICES

## 2024-03-08 PROCEDURE — 59409 OBSTETRICAL CARE: CPT

## 2024-03-08 PROCEDURE — 700111 HCHG RX REV CODE 636 W/ 250 OVERRIDE (IP): Mod: JZ | Performed by: OBSTETRICS & GYNECOLOGY

## 2024-03-08 PROCEDURE — 700102 HCHG RX REV CODE 250 W/ 637 OVERRIDE(OP): Performed by: OBSTETRICS & GYNECOLOGY

## 2024-03-08 PROCEDURE — 10907ZC DRAINAGE OF AMNIOTIC FLUID, THERAPEUTIC FROM PRODUCTS OF CONCEPTION, VIA NATURAL OR ARTIFICIAL OPENING: ICD-10-PCS | Performed by: OBSTETRICS & GYNECOLOGY

## 2024-03-08 PROCEDURE — 36415 COLL VENOUS BLD VENIPUNCTURE: CPT

## 2024-03-08 PROCEDURE — 770002 HCHG ROOM/CARE - OB PRIVATE (112)

## 2024-03-08 PROCEDURE — 700105 HCHG RX REV CODE 258: Performed by: OBSTETRICS & GYNECOLOGY

## 2024-03-08 PROCEDURE — 10H07YZ INSERTION OF OTHER DEVICE INTO PRODUCTS OF CONCEPTION, VIA NATURAL OR ARTIFICIAL OPENING: ICD-10-PCS | Performed by: OBSTETRICS & GYNECOLOGY

## 2024-03-08 PROCEDURE — 85027 COMPLETE CBC AUTOMATED: CPT

## 2024-03-08 RX ORDER — BISACODYL 10 MG
10 SUPPOSITORY, RECTAL RECTAL PRN
Status: DISCONTINUED | OUTPATIENT
Start: 2024-03-08 | End: 2024-03-09 | Stop reason: HOSPADM

## 2024-03-08 RX ORDER — IBUPROFEN 800 MG/1
800 TABLET ORAL EVERY 8 HOURS PRN
Status: DISCONTINUED | OUTPATIENT
Start: 2024-03-08 | End: 2024-03-09 | Stop reason: HOSPADM

## 2024-03-08 RX ORDER — DOCUSATE SODIUM 100 MG/1
100 CAPSULE, LIQUID FILLED ORAL 2 TIMES DAILY PRN
Status: DISCONTINUED | OUTPATIENT
Start: 2024-03-08 | End: 2024-03-09 | Stop reason: HOSPADM

## 2024-03-08 RX ORDER — SODIUM CHLORIDE, SODIUM LACTATE, POTASSIUM CHLORIDE, CALCIUM CHLORIDE 600; 310; 30; 20 MG/100ML; MG/100ML; MG/100ML; MG/100ML
INJECTION, SOLUTION INTRAVENOUS PRN
Status: DISCONTINUED | OUTPATIENT
Start: 2024-03-08 | End: 2024-03-09 | Stop reason: HOSPADM

## 2024-03-08 RX ORDER — CARBOPROST TROMETHAMINE 250 UG/ML
250 INJECTION, SOLUTION INTRAMUSCULAR
Status: DISCONTINUED | OUTPATIENT
Start: 2024-03-08 | End: 2024-03-09 | Stop reason: HOSPADM

## 2024-03-08 RX ORDER — CALCIUM CARBONATE 500 MG/1
1000 TABLET, CHEWABLE ORAL EVERY 6 HOURS PRN
Status: DISCONTINUED | OUTPATIENT
Start: 2024-03-08 | End: 2024-03-09 | Stop reason: HOSPADM

## 2024-03-08 RX ORDER — ACETAMINOPHEN 500 MG
1000 TABLET ORAL EVERY 6 HOURS PRN
Status: DISCONTINUED | OUTPATIENT
Start: 2024-03-08 | End: 2024-03-09 | Stop reason: HOSPADM

## 2024-03-08 RX ORDER — METHYLERGONOVINE MALEATE 0.2 MG/ML
0.2 INJECTION INTRAVENOUS
Status: DISCONTINUED | OUTPATIENT
Start: 2024-03-08 | End: 2024-03-09 | Stop reason: HOSPADM

## 2024-03-08 RX ORDER — SIMETHICONE 125 MG
125 TABLET,CHEWABLE ORAL 4 TIMES DAILY PRN
Status: DISCONTINUED | OUTPATIENT
Start: 2024-03-08 | End: 2024-03-09 | Stop reason: HOSPADM

## 2024-03-08 RX ORDER — MISOPROSTOL 200 UG/1
600 TABLET ORAL
Status: DISCONTINUED | OUTPATIENT
Start: 2024-03-08 | End: 2024-03-09 | Stop reason: HOSPADM

## 2024-03-08 RX ORDER — VITAMIN A ACETATE, BETA CAROTENE, ASCORBIC ACID, CHOLECALCIFEROL, .ALPHA.-TOCOPHEROL ACETATE, DL-, THIAMINE MONONITRATE, RIBOFLAVIN, NIACINAMIDE, PYRIDOXINE HYDROCHLORIDE, FOLIC ACID, CYANOCOBALAMIN, CALCIUM CARBONATE, FERROUS FUMARATE, ZINC OXIDE, CUPRIC OXIDE 3080; 12; 120; 400; 1; 1.84; 3; 20; 22; 920; 25; 200; 27; 10; 2 [IU]/1; UG/1; MG/1; [IU]/1; MG/1; MG/1; MG/1; MG/1; MG/1; [IU]/1; MG/1; MG/1; MG/1; MG/1; MG/1
1 TABLET, FILM COATED ORAL
Status: DISCONTINUED | OUTPATIENT
Start: 2024-03-09 | End: 2024-03-09 | Stop reason: HOSPADM

## 2024-03-08 RX ADMIN — ONDANSETRON 4 MG: 2 INJECTION INTRAMUSCULAR; INTRAVENOUS at 11:04

## 2024-03-08 RX ADMIN — ACETAMINOPHEN 1000 MG: 500 TABLET ORAL at 15:51

## 2024-03-08 RX ADMIN — IBUPROFEN 800 MG: 800 TABLET, FILM COATED ORAL at 19:31

## 2024-03-08 RX ADMIN — ROPIVACAINE HYDROCHLORIDE: 2 INJECTION, SOLUTION EPIDURAL; INFILTRATION at 03:58

## 2024-03-08 RX ADMIN — SODIUM CHLORIDE, POTASSIUM CHLORIDE, SODIUM LACTATE AND CALCIUM CHLORIDE: 600; 310; 30; 20 INJECTION, SOLUTION INTRAVENOUS at 12:38

## 2024-03-08 RX ADMIN — ROPIVACAINE HYDROCHLORIDE: 2 INJECTION, SOLUTION EPIDURAL; INFILTRATION at 11:36

## 2024-03-08 RX ADMIN — ACETAMINOPHEN 1000 MG: 500 TABLET, FILM COATED ORAL at 21:45

## 2024-03-08 RX ADMIN — OXYTOCIN 20 UNITS: 10 INJECTION, SOLUTION INTRAMUSCULAR; INTRAVENOUS at 15:30

## 2024-03-08 ASSESSMENT — PAIN DESCRIPTION - PAIN TYPE
TYPE: ACUTE PAIN

## 2024-03-08 NOTE — ANESTHESIA PREPROCEDURE EVALUATION
Date: 03/07/24  Procedure: Labor Epidural         Relevant Problems   No relevant active problems       Physical Exam    Airway   Mallampati: I  TM distance: >3 FB  Neck ROM: limited       Cardiovascular - normal exam     Dental - normal exam           Pulmonary   Breath sounds clear to auscultation     Abdominal    Neurological - normal exam                   Anesthesia Plan    ASA 2       Plan - epidural   Neuraxial block will be labor analgesia                Postoperative Plan: Postoperative administration of opioids is intended.    Pertinent diagnostic labs and testing reviewed    Informed Consent:    Anesthetic plan and risks discussed with patient.

## 2024-03-08 NOTE — PROGRESS NOTES
"Labor Note    S: Doing well. Epidural working well. , Stevenson, at bedside. Expecting girl #3. Denies complications with prior deliveries. Babies were previously 5lb.     O: /63   Pulse 88   Temp 36.1 °C (96.9 °F) (Temporal)   Ht 1.6 m (5' 3\")   Wt 62.6 kg (138 lb)   SpO2 93%   Gen: NAD  SVE: 5//-2 AROM clear   [place]: q3-4  FHT: 130s with moderate LTV. +accels. No variables/decels    Labs: A+, RNI, GBS neg  Recent Labs     24  1700   WBC 12.1*   RBC 3.46*   HEMOGLOBIN 10.5*   HEMATOCRIT 31.8*   MCV 91.9   MCH 30.3   RDW 50.1*   PLATELETCT 247   MPV 11.7   NEUTSPOLYS 75.00*   LYMPHOCYTES 13.30*   MONOCYTES 7.00   EOSINOPHILS 0.90   BASOPHILS 0.30     A/P 29yo  @ 37w1d, IOL for cholestasis of pregnancy   Anemia    Labor: Continue pitocin  Pain: well controlled with epidural  FWB: Cat I    Gabbi Goodrich MD   "

## 2024-03-08 NOTE — PROGRESS NOTES
0700: Report received from RADHA Tom. Augmentation of labor discussed, questions answered. Care assumed at this time with pt in stable condition.    1654: Pt up to bathroom, voided. Pt tx'd from S216 to S353 in stable condition with viable infant female in arms. Pt belongings with pt.     1700: Report given to RADHA Beaulieu. Post partum POC discussed, questions answered. Care relinquished at this time with pt in stable condition.

## 2024-03-08 NOTE — ANESTHESIA PROCEDURE NOTES
Epidural Block    Date/Time: 3/7/2024 8:18 PM    Performed by: Linda Jones M.D.  Authorized by: Linda Jones M.D.    Patient Location:  OB  Start Time:  3/7/2024 8:18 PM  Reason for Block: labor analgesia    patient identified, IV checked, site marked, risks and benefits discussed, surgical consent, monitors and equipment checked and pre-op evaluation    Patient Position:  Sitting  Prep: ChloraPrep, patient draped and sterile technique    Monitoring:  Blood pressure, continuous pulse oximetry and heart rate  Approach:  Midline  Location:  L3-L4  Injection Technique:  KRISS saline  Skin infiltration:  Lidocaine  Strength:  1%  Dose:  3ml  Needle Type:  Tuohy  Needle Gauge:  17 G  Needle Length:  3.5 in  Loss of resistance::  4  Catheter Size:  19 G  Catheter at Skin Depth:  9  Test Dose Result:  Negative

## 2024-03-08 NOTE — DISCHARGE SUMMARY
"Obstetrics Discharge Summary    Admission Date: 3/7/2024     Discharge Date: 3/9/2024      ADMISSION DIAGNOSIS:  1.  28-year-old  7 para 2-0-4-2 at 37 weeks  2.  Intrahepatic cholestasis of pregnancy   3.  Anemia    DISCHARGE DIAGNOSIS:  1.  Status postnormal spontaneous vaginal delivery  2.  Intrahepatic cholestasis of pregnancy  3.  Anemia    DETAILS OF HOSPITAL STAY  Presenting Problem/History of Present Illness: Scheduled induction of labor for intrahepatic cholestasis of pregnancy     Hospital Course:  The patient is a 28y.o.  7 para 2-0-4-2, who presented to [facility] at 37 weeks with a chief complaint of cholestasis of pregnancy. She had prenatal care with [facility] with Dr. Tena.  Her pregnancy was complicated by cholestasis of pregnancy. She had an normal spontaneous vaginal delivery. Complications with delivery: None apparent. Estimated blood loss was 350 ml. The patient delivered a viable female infant weighing 6lb6oz with Apgars as below in delivery summary.  The baby is named Ibeth.    The patient’s recovery and postpartum course were unremarkable. By postpartum day # 1 patient met all appropriate milestones and was stable to be discharged to home.    APGARs:   8/9    COMPLICATIONS: None.    PHYSICAL EXAM:  Vitals: /82   Pulse 73   Temp 36.8 °C (98.3 °F) (Temporal)   Resp 18   Ht 1.6 m (5' 3\")   Wt 62.6 kg (138 lb)   SpO2 98%   General: Alert, conversational, pleasant, no acute distress  ABD: Soft, non-tender, non-distended, fundus firm, non-tender, below the umbilicus  : Deferred  Extremities: Moves all, trace edema       RH status: Positive   Rubella Status: Nonimmune.  Vaccination recommended and offered  GBS: Negative    LABS/STUDIES:   Recent Labs     24  1700 24  2244   WBC 12.1* 14.4*   RBC 3.46* 2.96*   HEMOGLOBIN 10.5* 9.4*   HEMATOCRIT 31.8* 27.7*   MCV 91.9 93.6   MCH 30.3 31.8   RDW 50.1* 51.2*   PLATELETCT 247 211   MPV " 11.7 11.4   NEUTSPOLYS 75.00*  --    LYMPHOCYTES 13.30*  --    MONOCYTES 7.00  --    EOSINOPHILS 0.90  --    BASOPHILS 0.30  --          DISPOSITION: Home.    DISCHARGE MEDICATIONS:    Current Outpatient Medications   Medication Sig Dispense Refill    ibuprofen (MOTRIN) 600 MG Tab Take 1 Tablet by mouth every 6 hours as needed for Mild Pain for up to 14 days. 42 Tablet 0    acetaminophen (TYLENOL) 325 MG Tab Take 1 Tablet by mouth every four hours as needed for Mild Pain for up to 7 days. 42 Tablet 0           DISCHARGE INSTRUCTIONS:  1. Pelvic rest for 6 weeks postpartum.   2. Postpartum visit in 6 weeks at [facility] [phone].  3. Return to the emergency department if experiencing increased vaginal bleeding, severe pain, temperature greater than 100.4, or any other concerns.    DISCHARGE CONDITION: Stable.    Gabbi Goodrich MD

## 2024-03-08 NOTE — CARE PLAN
The patient is Stable - Low risk of patient condition declining or worsening    Shift Goals  Clinical Goals: delivery  Patient Goals: safe delivery  Family Goals: support      Problem: Knowledge Deficit - L&D  Goal: Patient and family/caregivers will demonstrate understanding of plan of care, disease process/condition, diagnostic tests and medications  Outcome: Progressing     Problem: Psychosocial - L&D  Goal: Patient's level of anxiety will decrease  Outcome: Progressing  Goal: Patient will be able to discuss coping skills during hospitalization  Outcome: Progressing  Goal: Patient's ability to re-evaluate and adapt role responsibilities will improve  Outcome: Progressing  Goal: Spiritual and cultural needs incorporated into hospitalization  Outcome: Progressing     Problem: Risk for Venous Thromboembolism (VTE)  Goal: VTE prevention measures will be implemented and patient will remain free from VTE  Outcome: Progressing     Problem: Risk for Infection and Impaired Wound Healing  Goal: Patient will remain free from infection  Outcome: Progressing  Goal: Patient's wound/surgical incision will decrease in size and heals properly  Outcome: Progressing     Problem: Risk for Fluid Imbalance  Goal: Patient's fluid volume balance will be maintained or improve  Outcome: Progressing     Problem: Risk for Injury  Goal: Patient and fetus will be free of preventable injury/complications  Outcome: Progressing     Problem: Pain  Goal: Patient's pain will be alleviated or reduced to the patient’s comfort goal  Outcome: Progressing     Problem: Discharge Barriers/Planning  Goal: Patient's continuum of care needs are met  Outcome: Progressing       Patient is not progressing towards the following goals:

## 2024-03-08 NOTE — H&P
DATE OF ADMISSION:  2024     IDENTIFICATION:  This is a 28-year-old  7, para 2-0-4-2 with an EDC of   2024 and EGA of 37 weeks, who presents with a chief complaint of   cholestasis of pregnancy.     HISTORY OF PRESENT ILLNESS:  The patient of Dr. Sierra, who has gotten good   prenatal care.  Prenatal care has been complicated by cholestasis of   pregnancy.  She has had normal twice weekly testing.  She is known beta strep   negative.  She presents today for induction of labor.  Her cervix is 3, 50,   -3.  Fetal heart tracings reactive, category 1.  She is not en.  She   has no other complaints.  Denies nausea, vomiting, fever, chills, change in   bowel or bladder habits.  Admits good fetal movement.  Denies symptoms of PIH.     OBSTETRICAL HISTORY:  Significant for 2 previous vaginal deliveries, 2   elective terminations, 2 miscarriages.     GYN HISTORY:  Denies STDs, abnormal Paps.     PAST MEDICAL HISTORY:     ALLERGIES:  None.     CURRENT MEDICATIONS:  Prenatal vitamins, vitamin D, Actigall.     MEDICAL PROBLEMS:  Cholestasis of pregnancy.     PAST SURGICAL HISTORY:  None.     SOCIAL HISTORY:  Denies alcohol, tobacco or drug abuse.     FAMILY HISTORY:  Noncontributory.     REVIEW OF SYSTEMS:  Times 12 is negative per AMA standards available in chart.     LABORATORY DATA:  She is A positive, rubella nonimmune.  She had a normal   one-hour.  Her beta strep is negative.  NIPT is low risk.  Her carrier screen   is normal.     PHYSICAL EXAMINATION:  VITAL SIGNS:  The patient is afebrile.  Vital signs within normal limits.    Fetal heart tracings reactive, category 1.  She is not en.  GENERAL:  She is awake, alert, in no apparent distress.  NECK:  Supple.  HEART:  Regular.  CHEST:  Clear.  BREASTS:  Symmetrical.  ABDOMEN:  Soft, gravid, size appropriate for dates.  EXTREMITIES:  Negative.  GYNECOLOGIC:  As above.     ASSESSMENT:  At this time:  1.  Pregnancy at 37 weeks.  2.   Cholestasis of pregnancy.  3.  Beta strep negative.  4.  Fetal status reassuring.     PLAN:  At this time:  1.  Admit.  2.  Fetal heart tone and contraction monitoring.  3.  Pain control.  4.  Pitocin.  5.  Consent for and anticipate normal spontaneous vaginal delivery.        ______________________________  MD LEX Vizcaino/JOSS    DD:  03/07/2024 17:39  DT:  03/07/2024 17:55    Job#:  [id number]

## 2024-03-08 NOTE — PROGRESS NOTES
EDC 3/28/2024 EGA 37.0    Pt presented to L&D for scheduled IOL for cholestasis. Pt denies regular, painful UC's, LOF, vaginal bleeding, states + fetal movement. EFM and TOCO applied. IV started, pt admitted.    - Report to RADHA Tom

## 2024-03-08 NOTE — PROGRESS NOTES
"Labor Note    S: Had some nausea but doing better. Feeling occ pressure     O: /61   Pulse 82   Temp 36.2 °C (97.2 °F) (Temporal)   Ht 1.6 m (5' 3\")   Wt 62.6 kg (138 lb)   SpO2 93%   Gen: NAD  SVE: 5/80-2 IUPC placed   Downs: q3-4  FHT: 130s with moderate LTV. +accels. No variables/decels    Labs: A+, RNI, GBS neg    A/P 27yo  @ 37w1d, IOL for cholestasis of pregnancy   Anemia    Labor: Continue pitocin. IUPC placed. Anticipate    Pain: well controlled with epidural  FWB: Cat I    Gabbi Goodrich MD   "

## 2024-03-08 NOTE — L&D DELIVERY NOTE
Labor and Delivery  Delivery Note     Preoperative Diagnosis:   28-year-old  7 para 2-0-4-2 at 37 weeks and 1 day  Intrahepatic cholestasis of pregnancy    Procedure:   Normal spontaneous vaginal delivery  Repair of right labial laceration    Postoperative Diagnosis:   Status post normal spontaneous vaginal delivery  Intrahepatic cholestasis of pregnancy    Primary OB: [place] Tena MD  Delivering OB: Gabbi Goodrich MD    Anesthesia: Epidural    Labor Course:   Samantha is a 28-year-old  7 para 2-0-4-2 who presented for induction of labor secondary to cholestasis of pregnancy.  She was augmented with Pitocin and received an epidural.  She underwent amniotomy to reveal clear fluid.    Procedure Note:   She pushed well to deliver baby girl OA over an intact perineum.  The right anterior shoulder delivered without difficulty.  Baby was placed on maternal stomach where she was immediately vigorous.  The cord was allowed to pulsate for 1 minute at which point it was clamped and cut.  The placenta delivered easily intact under gentle manual traction.  She had a right labial laceration which was repaired in the usual fashion using 3-0 Vicryl    Findings:   Baby girl at 1417.  Apgars 8 and 9.  Weight currently pending.  Named [place]  Normal placenta with three-vessel cord  Clear amniotic fluid    Medications:   Pitocin per protocol    EBL: 350 cc    Disposition: Stable to postpartum and  nursery    Gabbi Goodrich MD

## 2024-03-09 ENCOUNTER — PHARMACY VISIT (OUTPATIENT)
Dept: PHARMACY | Facility: MEDICAL CENTER | Age: 29
End: 2024-03-09
Payer: MEDICARE

## 2024-03-09 VITALS
WEIGHT: 138 LBS | DIASTOLIC BLOOD PRESSURE: 64 MMHG | OXYGEN SATURATION: 93 % | SYSTOLIC BLOOD PRESSURE: 101 MMHG | HEART RATE: 67 BPM | HEIGHT: 63 IN | TEMPERATURE: 97.4 F | RESPIRATION RATE: 18 BRPM | BODY MASS INDEX: 24.45 KG/M2

## 2024-03-09 PROCEDURE — 700102 HCHG RX REV CODE 250 W/ 637 OVERRIDE(OP): Performed by: OBSTETRICS & GYNECOLOGY

## 2024-03-09 PROCEDURE — A9270 NON-COVERED ITEM OR SERVICE: HCPCS | Performed by: OBSTETRICS & GYNECOLOGY

## 2024-03-09 PROCEDURE — RXMED WILLOW AMBULATORY MEDICATION CHARGE: Performed by: OBSTETRICS & GYNECOLOGY

## 2024-03-09 RX ORDER — ACETAMINOPHEN 325 MG/1
325 TABLET ORAL EVERY 4 HOURS PRN
Qty: 42 TABLET | Refills: 0 | Status: SHIPPED | OUTPATIENT
Start: 2024-03-09 | End: 2024-03-16

## 2024-03-09 RX ORDER — IBUPROFEN 600 MG/1
600 TABLET ORAL EVERY 6 HOURS PRN
Qty: 42 TABLET | Refills: 0 | Status: SHIPPED | OUTPATIENT
Start: 2024-03-09 | End: 2024-03-23

## 2024-03-09 RX ADMIN — IBUPROFEN 800 MG: 800 TABLET, FILM COATED ORAL at 11:17

## 2024-03-09 RX ADMIN — DOCUSATE SODIUM 100 MG: 100 CAPSULE, LIQUID FILLED ORAL at 14:02

## 2024-03-09 RX ADMIN — IBUPROFEN 800 MG: 800 TABLET, FILM COATED ORAL at 03:00

## 2024-03-09 RX ADMIN — ACETAMINOPHEN 1000 MG: 500 TABLET, FILM COATED ORAL at 07:30

## 2024-03-09 RX ADMIN — PRENATAL WITH FERROUS FUM AND FOLIC ACID 1 TABLET: 3080; 920; 120; 400; 22; 1.84; 3; 20; 10; 1; 12; 200; 27; 25; 2 TABLET ORAL at 07:30

## 2024-03-09 ASSESSMENT — PAIN DESCRIPTION - PAIN TYPE
TYPE: ACUTE PAIN

## 2024-03-09 ASSESSMENT — PATIENT HEALTH QUESTIONNAIRE - PHQ9
SUM OF ALL RESPONSES TO PHQ9 QUESTIONS 1 AND 2: 0
2. FEELING DOWN, DEPRESSED, IRRITABLE, OR HOPELESS: NOT AT ALL
1. LITTLE INTEREST OR PLEASURE IN DOING THINGS: NOT AT ALL

## 2024-03-09 ASSESSMENT — EDINBURGH POSTNATAL DEPRESSION SCALE (EPDS)
I HAVE BEEN ABLE TO LAUGH AND SEE THE FUNNY SIDE OF THINGS: AS MUCH AS I ALWAYS COULD
I HAVE FELT SAD OR MISERABLE: NO, NOT AT ALL
THINGS HAVE BEEN GETTING ON TOP OF ME: NO, MOST OF THE TIME I HAVE COPED QUITE WELL
I HAVE BEEN SO UNHAPPY THAT I HAVE BEEN CRYING: NO, NEVER
I HAVE LOOKED FORWARD WITH ENJOYMENT TO THINGS: AS MUCH AS I EVER DID
I HAVE BEEN SO UNHAPPY THAT I HAVE HAD DIFFICULTY SLEEPING: NOT AT ALL
I HAVE BEEN ANXIOUS OR WORRIED FOR NO GOOD REASON: NO, NOT AT ALL
I HAVE BLAMED MYSELF UNNECESSARILY WHEN THINGS WENT WRONG: YES, SOME OF THE TIME
THE THOUGHT OF HARMING MYSELF HAS OCCURRED TO ME: NEVER
I HAVE FELT SCARED OR PANICKY FOR NO GOOD REASON: NO, NOT AT ALL

## 2024-03-09 NOTE — ANESTHESIA POSTPROCEDURE EVALUATION
Patient: Samantha Fierro    Procedure Summary       Date: 03/07/24 Room / Location:     Anesthesia Start: 2010 Anesthesia Stop: 03/08/24 1417    Procedure: Labor Epidural Diagnosis:     Scheduled Providers:  Responsible Provider: Tommy Zurita M.D.    Anesthesia Type: epidural ASA Status: 2            Final Anesthesia Type: epidural  Last vitals  BP   Blood Pressure: 128/82    Temp   37.3 °C (99.2 °F)    Pulse   81   Resp   18    SpO2   93 %      Anesthesia Post Evaluation    Patient location during evaluation: PACU  Patient participation: complete - patient participated  Level of consciousness: awake and alert    Airway patency: patent  Anesthetic complications: no  Cardiovascular status: hemodynamically stable  Respiratory status: acceptable  Hydration status: euvolemic    PONV: none          There were no known notable events for this encounter.     Nurse Pain Score: 4 (NPRS)

## 2024-03-09 NOTE — LACTATION NOTE
This note was copied from a baby's chart.  Initial lactation consultation:    37w1d infant delivered vaginally to a  mother 3/8/24 at 1417. Current latch score 7, current weight loss 1.89%.     Breast feeding risk factors: none noted    Breast feeding history: mom reports she did not breast feed her first child. Her second child was breast fed for 1 month. She reports that her second child only latched to the right breast, and she got mastitis on the left side and discontinues breast feeding.     Mom reports breast feeding has been going well. She is latching her baby frequently, and she reports that the latch is comfortable. She declined latch assistance/breast assessment at this time.     Breast feeding education provided: Education provided regarding the milk making process and supply in demand. Frequent skin to skin encouraged. Encouraged MOB to offer the breast to baby any time she is showing hunger cues (cues reviewed). Encouraged MOB not to limit baby's time at the breast. Anticipatory guidance provided regarding typical  feeding behaviors in the first 24-48hrs, including cluster feeding. Proper positioning and latch technique verbalized. Education provided regarding the importance of achieving a deep latch with each feeding to ensure proper stimulation, milk transfer, and reduce the chance for nipple damage/pain. Watch for stools to become yellow/green by day 5.    Plan: Continue cue based feedings, at least 8 times every 24hrs. Offer both breasts at each feeding. Frequent skin to skin. Do not limit baby's time at the breast. Reach out to RN/LC for assistance while inpatient. [facility] outpatient lactation consultant handout provided. MOB not yet enrolled with [facility], will fax [facility] referral.   Consultant

## 2024-03-09 NOTE — PROGRESS NOTES
1930: Report received from RADHA Beaulieu. Assumed care of pt.     2145: Assessment complete. Reinforced expected lochia color and amount and when to call RN. Reinforced safe sleep, skin to skin, bundling in open crib, and feeding frequency and duration for infant. Education on hand expression and encouraged pt to look at [facility] breastfeeding videos and the birth and beyond henry.  Reviewed plan of care, all questions answered at this time.

## 2024-03-09 NOTE — PROGRESS NOTES
1705- Patient arrived to Room S353.  Report received from KATHERINE Jimenez RN.  1725- Patient assessment done.  IV patent.  Discussed pain management with patient, to include MD orders for prn pain medication.  Patient oriented to room and call system.  Reviewed plan of care.  Patient verbalized understanding.  FOB at bedside

## 2024-03-09 NOTE — CARE PLAN
The patient is Stable - Low risk of patient condition declining or worsening    Shift Goals  Clinical Goals: Cervical change  Patient Goals: Delivery  Family Goals: Support and education    Progress made toward(s) clinical / shift goals:  Support and education provided to pt and family as labor progressed to delivery.     Patient is not progressing towards the following goals:N/A      Problem: Risk for Infection and Impaired Wound Healing  Goal: Patient will remain free from infection  Outcome: Progressing     Problem: Pain  Goal: Patient's pain will be alleviated or reduced to the patient’s comfort goal  Outcome: Progressing

## 2024-03-09 NOTE — PROGRESS NOTES
1120- Discharge teaching reviewed with pt, all questions answered.  Pt has all written information on self and infant care, including prescriptions,  screening slip and information, and follow-up instructions.  Bands verified, cuddles removed.  Pt will call when she is ready for car seat check.     1450- Infant placed in car seat by POB and checked by this RN.  Hospital escort provided.  Pt discharged home in stable condition with POB.

## 2024-03-09 NOTE — DISCHARGE INSTRUCTIONS
PATIENT DISCHARGE EDUCATION INSTRUCTION SHEET    REASONS TO CALL YOUR OBSTETRICIAN  Persistent fever, shaking, chills (Temperature higher than 100.4) may indicate you have an infection  Heavy bleeding: soaking more than 1 pad per hour; Passing clots an egg-sized clot or bigger may mean you have an postpartum hemorrhage  Foul odor from vagina or bad smelling or discolored discharge or blood  Breast infection (Mastitis symptoms); breast pain, chills, fever, redness or red streaks, may feel flu like symptoms  Urinary pain, burning or frequency  Redness, swelling, warmth, or painful to touch in the calf area of your leg may mean you have a blood clot  Severe or intensified depression, thoughts or feelings of wanting to hurt yourself or someone else   Pain in chest, obstructed breathing or shortness of breath (trouble catching your breath) may mean you are having a postpartum complication. Call your provider immediately   Headache that does not get better, even after taking medicine, a bad headache with vision changes or pain in the upper right area of your belly may mean you have high blood pressure or post birth preeclampsia. Call your provider immediately    HAND WASHING  All family and friends should wash their hands:  Before and after holding the baby  Before feeding the baby  After using the restroom or changing the baby's diaper    WOUND CARE  Ask your physician for additional care instructions. In general:  Episiotomy/Laceration  May use mendel-spray bottle, witch hazel pads and dermaplast spray for comfort  Use mendel-spray bottle after urinating to cleanse perineal area  To prevent burning during urination spray mendel-water bottle on labial area   Pat perineal area dry until episiotomy/laceration is healed  Continue to use mendel-bottle until bleeding stops as needed  If have a 2nd degree laceration or greater, a Sitz bath can offer relief from soreness, burning, and inflammation   Sitz Bath   Sit in 6 inches of warm  "water and soak laceration as needed until the laceration heals    VAGINAL CARE AND BLEEDING  Nothing inside vagina for 6 weeks:   No sexual intercourse, tampons or douching  Bleeding may continue for 2-4 weeks. Amount and color may vary  Soaking 1 pad or more in an hour for several hours is considered heavy bleeding  Passing large egg sized blood clots can be concerning  If you feel like you have heavy bleeding or are having increasing amount of blood clots call your Obstetrician immediately  If you begin feeling faint upon standing, feeling sick to your stomach, have clammy skin, a really fast heartbeat, have chills, start feeling confused, dizzy, sleepy or weak, or feeling like you're going to faint call your Obstetrician immediately    HYPERTENSION   Preeclampsia or gestational hypertension are types of high blood pressure that only pregnant women can get. It is important for you to be aware of symptoms to seek early intervention and treatment. If you have any of these symptoms immediately call your Obstetrician    Vision changes or blurred vision   Severe headache or pain that is unrelieved with medication and will not go away  Persistent pain in upper abdomen or shoulder   Increased swelling of face, feet, or hands  Difficulty breathing or shortness of breath at rest  Urinating less than usual    URINATION AND BOWEL MOVEMENTS  Eating more fiber (bran cereal, fruits, and vegetables) and drinking plenty of fluids will help to avoid constipation  Urinary frequency and urgency after childbirth is normal  If you experience any urinary pain, burning or frequency call your provider    BIRTH CONTROL  It is possible to become pregnant at any time after delivery and while breastfeeding  Plan to discuss a method of birth control with your physician at your post delivery follow up visit    POSTPARTUM BLUES  During the first few days after birth, you may experience a sense of the \"blues\" which may include impatience, " "irritability or even crying. These feelings come and go quickly. However, as many as 1 in 10 women experience emotional symptoms known as postpartum depression.     POSTPARTUM DEPRESSION    May start as early as the second or third day after delivery or take several weeks or months to develop. Symptoms of \"blues\" are present, but are more intense: Crying spells; loss of appetite; feelings of hopelessness or loss of control; fear of touching the baby; over concern or no concern at all about the baby; little or no concern about your own appearance/caring for yourself; and/or inability to sleep or excessive sleeping. Contact your Obstetrician if you are experiencing any of these symptoms     PREVENTING SHAKEN BABY  If you are angry or stressed, PUT THE BABY IN THE CRIB, step away, take some deep breaths, and wait until you are calm to care for the baby. DO NOT SHAKE THE BABY. You are not alone, call a supporter for help.  [facility] 24/7 crisis call line (368-714-0066) or (1-632.187.4525)  You can also text them, text \"ANSWER\" (109971)  "

## 2024-03-09 NOTE — CARE PLAN
The patient is Stable - Low risk of patient condition declining or worsening    Shift Goals  Clinical Goals: VSS, fundus firm, lochia WNL  Patient Goals: Delivery  Family Goals: Support and education    Progress made toward(s) clinical / shift goals:    Problem: Knowledge Deficit - Postpartum  Goal: Patient will verbalize and demonstrate understanding of self and infant care  Outcome: Progressing  Note: Reinforced expected lochia color and amount, s/s of infection, s/s of blood clots, s/s of pre-eclampsia that can occur during the postpartum period, and self care.   Reinforced skin to skin, safe sleep, feeding frequency/duration, bundling in open crib, appropriate  dress, and use of hat for infant.      Problem: Altered Physiologic Condition  Goal: Patient physiologically stable as evidenced by normal lochia, palpable uterine involution and vitals within normal limits  Outcome: Progressing  Note: VSS, lochia WNL, uterus is palpable and involuting appropriately.        Patient is not progressing towards the following goals:

## 2024-03-09 NOTE — CARE PLAN
The patient is Stable - Low risk of patient condition declining or worsening    Shift Goals  Clinical Goals: Maintain fundus firm, lochia light; pain management  Patient Goals:   Family Goals:     Progress made toward(s) clinical / shift goals:  Fundus firm, lochia scant; pt reported pain relief with use of prn pain medication and prn use of heat packs

## 2024-03-09 NOTE — PROGRESS NOTES
0700-- Received report from Judy ECKERT RN, Infant at bedside in open crib no signs of distress.  Pt resting in bed. Discussed pain management for the day.  No further needs at the time.  Call light within reach, bed locked and in lowest position.  Rounding in place.    0920-- Assessment completed, VSS, Pt declines PRN pain medication at this time.  Discussed plan of care for the day that pt is comfortable with.  All questions answered at this time.  Will continue to monitor.

## 2024-03-09 NOTE — ANESTHESIA TIME REPORT
Anesthesia Start and Stop Event Times       Date Time Event    3/7/2024 2010 Ready for Procedure     2010 Anesthesia Start    3/8/2024 1417 Anesthesia Stop          Responsible Staff  03/07/24 to 03/08/24      Name Role Begin End    Linda Jones M.D. Anesth 2010 0702    Tommy Zurita M.D. Anesth 0702 1417          Overtime Reason:  no overtime (within assigned shift)    Comments:

## 2024-03-09 NOTE — CARE PLAN
The patient is Stable - Low risk of patient condition declining or worsening    Shift Goals  Clinical Goals: Pt pain will be well controlled  Patient Goals: Delivery  Family Goals: Support and education    Progress made toward(s) clinical / shift goals:  Pt pain has been well controlled.     Patient is not progressing towards the following goals: N/A

## 2024-04-02 ENCOUNTER — HOSPITAL ENCOUNTER (EMERGENCY)
Facility: MEDICAL CENTER | Age: 29
End: 2024-04-02
Attending: EMERGENCY MEDICINE
Payer: OTHER MISCELLANEOUS

## 2024-04-02 VITALS
HEART RATE: 68 BPM | HEIGHT: 63 IN | TEMPERATURE: 98 F | DIASTOLIC BLOOD PRESSURE: 69 MMHG | SYSTOLIC BLOOD PRESSURE: 105 MMHG | RESPIRATION RATE: 17 BRPM | OXYGEN SATURATION: 96 % | BODY MASS INDEX: 24.45 KG/M2

## 2024-04-02 DIAGNOSIS — S09.90XA CLOSED HEAD INJURY, INITIAL ENCOUNTER: ICD-10-CM
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
## 2024-04-02 PROCEDURE — 99284 EMERGENCY DEPT VISIT MOD MDM: CPT | Mod: EDC

## 2024-04-02 RX ORDER — NAPROXEN 500 MG/1
500 TABLET ORAL 2 TIMES DAILY WITH MEALS
Qty: 20 TABLET | Refills: 0 | Status: SHIPPED | OUTPATIENT
Start: 2024-04-02
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
## 2024-11-05 ENCOUNTER — HOSPITAL ENCOUNTER (EMERGENCY)
Facility: MEDICAL CENTER | Age: 29
End: 2024-11-05
Attending: EMERGENCY MEDICINE
Payer: COMMERCIAL

## 2024-11-05 VITALS
HEART RATE: 107 BPM | RESPIRATION RATE: 16 BRPM | TEMPERATURE: 98.7 F | HEIGHT: 63 IN | BODY MASS INDEX: 22.19 KG/M2 | SYSTOLIC BLOOD PRESSURE: 129 MMHG | WEIGHT: 125.22 LBS | OXYGEN SATURATION: 95 % | DIASTOLIC BLOOD PRESSURE: 69 MMHG

## 2024-11-05 DIAGNOSIS — J11.1 INFLUENZA: ICD-10-CM

## 2024-11-05 LAB
FLUAV RNA SPEC QL NAA+PROBE: NEGATIVE
FLUBV RNA SPEC QL NAA+PROBE: NEGATIVE
RSV RNA SPEC QL NAA+PROBE: NEGATIVE
SARS-COV-2 RNA RESP QL NAA+PROBE: NOTDETECTED

## 2024-11-05 PROCEDURE — 700111 HCHG RX REV CODE 636 W/ 250 OVERRIDE (IP): Mod: UD

## 2024-11-05 PROCEDURE — 700102 HCHG RX REV CODE 250 W/ 637 OVERRIDE(OP): Mod: UD | Performed by: EMERGENCY MEDICINE

## 2024-11-05 PROCEDURE — 99283 EMERGENCY DEPT VISIT LOW MDM: CPT | Mod: EDC

## 2024-11-05 PROCEDURE — A9270 NON-COVERED ITEM OR SERVICE: HCPCS | Mod: UD | Performed by: EMERGENCY MEDICINE

## 2024-11-05 PROCEDURE — 0241U HCHG SARS-COV-2 COVID-19 NFCT DS RESP RNA 4 TRGT ED POC: CPT

## 2024-11-05 RX ORDER — ONDANSETRON 4 MG/1
4 TABLET, ORALLY DISINTEGRATING ORAL EVERY 8 HOURS PRN
Qty: 10 TABLET | Refills: 0 | Status: SHIPPED | OUTPATIENT
Start: 2024-11-05

## 2024-11-05 RX ORDER — ACETAMINOPHEN 500 MG
1000 TABLET ORAL ONCE
Status: COMPLETED | OUTPATIENT
Start: 2024-11-05 | End: 2024-11-05

## 2024-11-05 RX ORDER — ONDANSETRON 4 MG/1
4 TABLET, ORALLY DISINTEGRATING ORAL ONCE
Status: COMPLETED | OUTPATIENT
Start: 2024-11-05 | End: 2024-11-05

## 2024-11-05 RX ORDER — IBUPROFEN 600 MG/1
600 TABLET, FILM COATED ORAL ONCE
Status: COMPLETED | OUTPATIENT
Start: 2024-11-05 | End: 2024-11-05

## 2024-11-05 RX ORDER — ONDANSETRON 4 MG/1
TABLET, ORALLY DISINTEGRATING ORAL
Status: COMPLETED
Start: 2024-11-05 | End: 2024-11-05

## 2024-11-05 RX ADMIN — ACETAMINOPHEN 1000 MG: 500 TABLET ORAL at 17:10

## 2024-11-05 RX ADMIN — ONDANSETRON 4 MG: 4 TABLET, ORALLY DISINTEGRATING ORAL at 16:27

## 2024-11-05 RX ADMIN — IBUPROFEN 600 MG: 600 TABLET, FILM COATED ORAL at 17:10

## 2024-11-05 ASSESSMENT — FIBROSIS 4 INDEX: FIB4 SCORE: 0.59

## 2024-11-06 NOTE — ED NOTES
POC viral swab collected and running. Parent updated on approximate wait times.     Mom given water and Gatorade to PO challenge per ERP.

## 2024-11-06 NOTE — DISCHARGE INSTRUCTIONS
Continue to use ibuprofen, Tylenol and the nausea medicine as needed to help with your symptoms.  Try to stay as hydrated as possible.  Come back to the ER if you are having a hard time breathing or having uncontrollable vomiting.

## 2024-11-06 NOTE — ED NOTES
Discharge instructions discussed with patient. Patient verbalized understanding. Pt alert & oriented x 4, VSS, and breathing steady and unlabored. Pt ambulated to lobby with steady gait.

## 2024-11-06 NOTE — ED NOTES
First interaction with patient  Verified and agreed with triage assessment. Pt a/o, skin pale, respirations even and unlabored. MMM, lungs CTA.  Patient changed into hospital gown.  Call light provided. ERP in the room.

## 2024-11-06 NOTE — ED PROVIDER NOTES
"ED Provider Note    CHIEF COMPLAINT  Chief Complaint   Patient presents with    N/V     Intermittent n/v since last Thursday.     Headache    Generalized Body Aches    Cough       EXTERNAL RECORDS REVIEWED  Last outpatient note in our system from November 2022 seen for cellulitis of the right external ear    HPI/ROS  LIMITATION TO HISTORY   None  OUTSIDE HISTORIAN(S):  None    Samantha Fierro is a 29 y.o. female who presents to the ER for nausea, vomiting, headache, body aches, cough subjective fevers.   and child are also sick with viral-like symptoms.  She is otherwise healthy.  She has not taken any medications for the symptoms.  Feels slightly short of breath at times.  No significant chest pain.  No significant abdominal pain.    PAST MEDICAL HISTORY       SURGICAL HISTORY  patient denies any surgical history    FAMILY HISTORY  No family history on file.    SOCIAL HISTORY  Social History     Tobacco Use    Smoking status: Never    Smokeless tobacco: Never   Vaping Use    Vaping status: Never Used   Substance and Sexual Activity    Alcohol use: No    Drug use: No    Sexual activity: Yes     Partners: Male     Birth control/protection: Injection       CURRENT MEDICATIONS  Home Medications       Reviewed by Dick Bustamante R.N. (Registered Nurse) on 11/05/24 at 1617  Med List Status: Partial     Medication Last Dose Status   naproxen (NAPROSYN) 500 MG Tab  Active   Eaogiv-Tiibedbor-Xsur-Fish Oil (PRENATAL + COMPLETE MULTI) 0.267 & 373 MG Therapy Pack  Active                    ALLERGIES  No Known Allergies    PHYSICAL EXAM  VITAL SIGNS: /69   Pulse (!) 107   Temp 37.1 °C (98.7 °F) (Temporal)   Resp 16   Ht 1.6 m (5' 3\")   Wt 56.8 kg (125 lb 3.5 oz)   LMP 11/05/2024 (Approximate)   SpO2 95%   Breastfeeding Yes   BMI 22.18 kg/m²    General: Sitting in stretcher, no distress  HEENT: Moist mucous membranes, normal conjunctiva, NCAT  CV: Tachycardic, no murmurs, 2+ radial " pulses  Pulmonary: Breathing comfortably on room air with clear breath sounds  Abdomen: Soft nondistended nontender    EKG/LABS  Flu, COVID and RSV are negative          COURSE & MEDICAL DECISION MAKING    ASSESSMENT, COURSE AND PLAN  Care Narrative: Differential includes influenza, COVID, other viral syndrome, dehydration, myositis, meningitis    On arrival patient is initially tachycardic and hypertensive.  No fever.  Breathing comfortably on room air with normal oxygenation and clear breath sounds.  Neurologically intact.  Differential above considered but given sick contact suspect she has a viral process.  She was given Zofran, Motrin and Tylenol.  Swabs for flu/COVID/RSV were obtained.  I did not feel any imaging of her lungs were necessary at this time as she has clear breath sounds, is breathing comfortably and normal oxygenation.  Low concern for pneumonia or other process.  Did not feel any blood work was necessary at this time.  On reevaluation after the above medication she was feeling significantly better.  Heart rate improved to low 100s.  Patient resting comfortably.  I did not feel any IV fluids or labs were necessary at this time based on rapid improvement with oral medications.  She can tolerate p.o. and will hydrate herself.  Viral swabs were negative however her daughter and her  both had positive flu a swab today.  Suspect she has a false negative and is suffering from influenza A.  No comorbidities to necessitate oseltamavir.  Supportive care discussed with the patient.  Discharged home in stable condition with return precautions          Escalation of care considered, and ultimately not performed:IV fluids, Laboratory analysis, and diagnostic imaging    Barriers to care at this time, including but not limited to: None.     Decision tools and prescription drugs considered including, but not limited to: None but did consider oseltamavir.    FINAL DIAGNOSIS  1. Influenza          Electronically signed by: Bhanu Choi M.D., 11/5/2024 4:45 PM

## 2024-11-06 NOTE — ED TRIAGE NOTES
Samantha Fierro is a 29 y.o. female arriving to [facility] ED.   Chief Complaint   Patient presents with    N/V     Intermittent n/v since last Thursday.     Headache    Generalized Body Aches    Cough     Patient awake, alert, developmentally appropriate behavior. Skin pink, warm and dry. Musculoskeletal exam wnl, good tone and moves all extremities well. Respirations even and unlabored, intermittent moist non productive cough. Abdomen soft, + vomiting, denies diarrhea.     Patient medicated at home with [facility] cough medicine and airborne (1200).     Aware to remain NPO until cleared by ERP.   Patient to lobby